# Patient Record
Sex: FEMALE | Race: WHITE | Employment: OTHER | URBAN - METROPOLITAN AREA
[De-identification: names, ages, dates, MRNs, and addresses within clinical notes are randomized per-mention and may not be internally consistent; named-entity substitution may affect disease eponyms.]

---

## 2017-10-25 PROBLEM — M47.812 FACET ARTHROPATHY, CERVICAL: Chronic | Status: ACTIVE | Noted: 2017-10-25

## 2018-05-21 ENCOUNTER — ANESTHESIA EVENT (OUTPATIENT)
Dept: OPERATING ROOM | Age: 78
End: 2018-05-21
Payer: MEDICARE

## 2018-05-21 RX ORDER — LISINOPRIL 10 MG/1
10 TABLET ORAL DAILY
Status: ON HOLD | COMMUNITY
End: 2022-02-26 | Stop reason: HOSPADM

## 2018-05-23 ENCOUNTER — HOSPITAL ENCOUNTER (OUTPATIENT)
Age: 78
Setting detail: OUTPATIENT SURGERY
Discharge: HOME | End: 2018-05-23
Attending: ANESTHESIOLOGY | Admitting: ANESTHESIOLOGY
Payer: MEDICARE

## 2018-05-23 ENCOUNTER — ANESTHESIA (OUTPATIENT)
Dept: OPERATING ROOM | Age: 78
End: 2018-05-23
Payer: MEDICARE

## 2018-05-23 VITALS
DIASTOLIC BLOOD PRESSURE: 72 MMHG | RESPIRATION RATE: 12 BRPM | SYSTOLIC BLOOD PRESSURE: 172 MMHG | OXYGEN SATURATION: 100 %

## 2018-05-23 VITALS
SYSTOLIC BLOOD PRESSURE: 183 MMHG | RESPIRATION RATE: 16 BRPM | HEART RATE: 76 BPM | TEMPERATURE: 98 F | HEIGHT: 64 IN | BODY MASS INDEX: 21.34 KG/M2 | OXYGEN SATURATION: 99 % | WEIGHT: 125 LBS | DIASTOLIC BLOOD PRESSURE: 94 MMHG

## 2018-05-23 DIAGNOSIS — M54.81 BILATERAL OCCIPITAL NEURALGIA: ICD-10-CM

## 2018-05-23 PROCEDURE — 3700000000 HC ANESTHESIA ATTENDED CARE: Performed by: ANESTHESIOLOGY

## 2018-05-23 PROCEDURE — 7100000011 HC PHASE II RECOVERY - ADDTL 15 MIN: Performed by: ANESTHESIOLOGY

## 2018-05-23 PROCEDURE — 2500000003 HC RX 250 WO HCPCS: Performed by: ANESTHESIOLOGY

## 2018-05-23 PROCEDURE — 6360000002 HC RX W HCPCS: Performed by: NURSE ANESTHETIST, CERTIFIED REGISTERED

## 2018-05-23 PROCEDURE — 3600000005 HC SURGERY LEVEL 5 BASE: Performed by: ANESTHESIOLOGY

## 2018-05-23 PROCEDURE — 2500000003 HC RX 250 WO HCPCS: Performed by: NURSE ANESTHETIST, CERTIFIED REGISTERED

## 2018-05-23 PROCEDURE — 2580000003 HC RX 258: Performed by: ANESTHESIOLOGY

## 2018-05-23 PROCEDURE — 7100000010 HC PHASE II RECOVERY - FIRST 15 MIN: Performed by: ANESTHESIOLOGY

## 2018-05-23 PROCEDURE — 6360000002 HC RX W HCPCS: Performed by: ANESTHESIOLOGY

## 2018-05-23 RX ORDER — LIDOCAINE HYDROCHLORIDE 20 MG/ML
INJECTION, SOLUTION EPIDURAL; INFILTRATION; INTRACAUDAL; PERINEURAL PRN
Status: DISCONTINUED | OUTPATIENT
Start: 2018-05-23 | End: 2018-05-23 | Stop reason: SDUPTHER

## 2018-05-23 RX ORDER — PROPOFOL 10 MG/ML
INJECTION, EMULSION INTRAVENOUS PRN
Status: DISCONTINUED | OUTPATIENT
Start: 2018-05-23 | End: 2018-05-23 | Stop reason: SDUPTHER

## 2018-05-23 RX ORDER — ONDANSETRON 2 MG/ML
INJECTION INTRAMUSCULAR; INTRAVENOUS PRN
Status: DISCONTINUED | OUTPATIENT
Start: 2018-05-23 | End: 2018-05-23 | Stop reason: SDUPTHER

## 2018-05-23 RX ORDER — SODIUM CHLORIDE, SODIUM LACTATE, POTASSIUM CHLORIDE, CALCIUM CHLORIDE 600; 310; 30; 20 MG/100ML; MG/100ML; MG/100ML; MG/100ML
INJECTION, SOLUTION INTRAVENOUS CONTINUOUS
Status: DISCONTINUED | OUTPATIENT
Start: 2018-05-23 | End: 2018-05-23 | Stop reason: HOSPADM

## 2018-05-23 RX ORDER — LIDOCAINE HYDROCHLORIDE 10 MG/ML
INJECTION, SOLUTION EPIDURAL; INFILTRATION; INTRACAUDAL; PERINEURAL PRN
Status: DISCONTINUED | OUTPATIENT
Start: 2018-05-23 | End: 2018-05-23 | Stop reason: HOSPADM

## 2018-05-23 RX ADMIN — PROPOFOL 20 MG: 10 INJECTION, EMULSION INTRAVENOUS at 15:58

## 2018-05-23 RX ADMIN — SODIUM CHLORIDE, POTASSIUM CHLORIDE, SODIUM LACTATE AND CALCIUM CHLORIDE: 600; 310; 30; 20 INJECTION, SOLUTION INTRAVENOUS at 15:24

## 2018-05-23 RX ADMIN — ONDANSETRON 4 MG: 2 INJECTION, SOLUTION INTRAMUSCULAR; INTRAVENOUS at 15:57

## 2018-05-23 RX ADMIN — LIDOCAINE HYDROCHLORIDE 25 MG: 20 INJECTION, SOLUTION EPIDURAL; INFILTRATION; INTRACAUDAL; PERINEURAL at 15:58

## 2018-05-23 RX ADMIN — PROPOFOL 20 MG: 10 INJECTION, EMULSION INTRAVENOUS at 15:59

## 2018-05-23 ASSESSMENT — PAIN DESCRIPTION - DESCRIPTORS: DESCRIPTORS: DISCOMFORT;STABBING

## 2018-05-23 ASSESSMENT — PULMONARY FUNCTION TESTS
PIF_VALUE: 0
PIF_VALUE: 1

## 2018-05-23 ASSESSMENT — PAIN SCALES - GENERAL
PAINLEVEL_OUTOF10: 0

## 2018-05-23 ASSESSMENT — PAIN - FUNCTIONAL ASSESSMENT: PAIN_FUNCTIONAL_ASSESSMENT: 0-10

## 2018-06-05 ENCOUNTER — ANESTHESIA EVENT (OUTPATIENT)
Dept: OPERATING ROOM | Age: 78
End: 2018-06-05
Payer: MEDICARE

## 2018-06-06 ENCOUNTER — HOSPITAL ENCOUNTER (OUTPATIENT)
Dept: OPERATING ROOM | Age: 78
Discharge: HOME | End: 2018-06-06
Payer: MEDICARE

## 2018-06-06 ENCOUNTER — HOSPITAL ENCOUNTER (OUTPATIENT)
Age: 78
Setting detail: OUTPATIENT SURGERY
Discharge: HOME | End: 2018-06-06
Attending: ANESTHESIOLOGY | Admitting: ANESTHESIOLOGY
Payer: MEDICARE

## 2018-06-06 ENCOUNTER — ANESTHESIA (OUTPATIENT)
Dept: OPERATING ROOM | Age: 78
End: 2018-06-06
Payer: MEDICARE

## 2018-06-06 VITALS
HEART RATE: 67 BPM | SYSTOLIC BLOOD PRESSURE: 161 MMHG | OXYGEN SATURATION: 99 % | DIASTOLIC BLOOD PRESSURE: 71 MMHG | HEIGHT: 65 IN | BODY MASS INDEX: 20.99 KG/M2 | WEIGHT: 126 LBS | RESPIRATION RATE: 16 BRPM

## 2018-06-06 VITALS
DIASTOLIC BLOOD PRESSURE: 69 MMHG | OXYGEN SATURATION: 100 % | TEMPERATURE: 98.6 F | RESPIRATION RATE: 12 BRPM | SYSTOLIC BLOOD PRESSURE: 136 MMHG

## 2018-06-06 DIAGNOSIS — M54.81 BILATERAL OCCIPITAL NEURALGIA: ICD-10-CM

## 2018-06-06 PROCEDURE — 7100000010 HC PHASE II RECOVERY - FIRST 15 MIN: Performed by: ANESTHESIOLOGY

## 2018-06-06 PROCEDURE — 7100000011 HC PHASE II RECOVERY - ADDTL 15 MIN: Performed by: ANESTHESIOLOGY

## 2018-06-06 PROCEDURE — 3209999900 FLUORO FOR SURGICAL PROCEDURES

## 2018-06-06 PROCEDURE — 3600000005 HC SURGERY LEVEL 5 BASE: Performed by: ANESTHESIOLOGY

## 2018-06-06 PROCEDURE — 3700000000 HC ANESTHESIA ATTENDED CARE: Performed by: ANESTHESIOLOGY

## 2018-06-06 PROCEDURE — 2500000003 HC RX 250 WO HCPCS: Performed by: ANESTHESIOLOGY

## 2018-06-06 PROCEDURE — 2500000003 HC RX 250 WO HCPCS: Performed by: NURSE ANESTHETIST, CERTIFIED REGISTERED

## 2018-06-06 PROCEDURE — 6360000002 HC RX W HCPCS: Performed by: ANESTHESIOLOGY

## 2018-06-06 PROCEDURE — 6360000002 HC RX W HCPCS: Performed by: NURSE ANESTHETIST, CERTIFIED REGISTERED

## 2018-06-06 PROCEDURE — 2580000003 HC RX 258: Performed by: ANESTHESIOLOGY

## 2018-06-06 RX ORDER — MIDAZOLAM HYDROCHLORIDE 1 MG/ML
INJECTION INTRAMUSCULAR; INTRAVENOUS PRN
Status: DISCONTINUED | OUTPATIENT
Start: 2018-06-06 | End: 2018-06-06 | Stop reason: SDUPTHER

## 2018-06-06 RX ORDER — SODIUM CHLORIDE, SODIUM LACTATE, POTASSIUM CHLORIDE, CALCIUM CHLORIDE 600; 310; 30; 20 MG/100ML; MG/100ML; MG/100ML; MG/100ML
INJECTION, SOLUTION INTRAVENOUS CONTINUOUS
Status: DISCONTINUED | OUTPATIENT
Start: 2018-06-06 | End: 2018-06-06 | Stop reason: HOSPADM

## 2018-06-06 RX ORDER — FENTANYL CITRATE 50 UG/ML
INJECTION, SOLUTION INTRAMUSCULAR; INTRAVENOUS PRN
Status: DISCONTINUED | OUTPATIENT
Start: 2018-06-06 | End: 2018-06-06 | Stop reason: SDUPTHER

## 2018-06-06 RX ORDER — PROPOFOL 10 MG/ML
INJECTION, EMULSION INTRAVENOUS PRN
Status: DISCONTINUED | OUTPATIENT
Start: 2018-06-06 | End: 2018-06-06 | Stop reason: SDUPTHER

## 2018-06-06 RX ORDER — LIDOCAINE HYDROCHLORIDE 20 MG/ML
INJECTION, SOLUTION INFILTRATION; PERINEURAL PRN
Status: DISCONTINUED | OUTPATIENT
Start: 2018-06-06 | End: 2018-06-06 | Stop reason: SDUPTHER

## 2018-06-06 RX ADMIN — FENTANYL CITRATE 50 MCG: 50 INJECTION, SOLUTION INTRAMUSCULAR; INTRAVENOUS at 12:36

## 2018-06-06 RX ADMIN — SODIUM CHLORIDE, POTASSIUM CHLORIDE, SODIUM LACTATE AND CALCIUM CHLORIDE: 600; 310; 30; 20 INJECTION, SOLUTION INTRAVENOUS at 10:40

## 2018-06-06 RX ADMIN — FENTANYL CITRATE 50 MCG: 50 INJECTION, SOLUTION INTRAMUSCULAR; INTRAVENOUS at 12:38

## 2018-06-06 RX ADMIN — PROPOFOL 20 MG: 10 INJECTION, EMULSION INTRAVENOUS at 12:42

## 2018-06-06 RX ADMIN — MIDAZOLAM HYDROCHLORIDE 2 MG: 1 INJECTION INTRAMUSCULAR; INTRAVENOUS at 12:34

## 2018-06-06 RX ADMIN — LIDOCAINE HYDROCHLORIDE 10 MG: 20 INJECTION, SOLUTION INFILTRATION; PERINEURAL at 12:42

## 2018-06-06 ASSESSMENT — PULMONARY FUNCTION TESTS
PIF_VALUE: 0

## 2018-06-06 ASSESSMENT — PAIN SCALES - GENERAL
PAINLEVEL_OUTOF10: 0

## 2018-06-06 ASSESSMENT — PAIN - FUNCTIONAL ASSESSMENT: PAIN_FUNCTIONAL_ASSESSMENT: 0-10

## 2018-07-10 ENCOUNTER — ANESTHESIA EVENT (OUTPATIENT)
Dept: OPERATING ROOM | Age: 78
End: 2018-07-10
Payer: MEDICARE

## 2018-07-10 NOTE — ANESTHESIA PRE PROCEDURE
IRAIS NOSE/CLEFT LIP/TIP Bilateral 6/6/2018    BILATERAL GREATER OCCIPITAL NERVE BLOCK #2 UNDER ANESTHESIA performed by Evette Cogan, DO at [facility] Left 10/18/2016    anterior       Social History:    Social History   Substance Use Topics    Smoking status: Never Smoker    Smokeless tobacco: Never Used    Alcohol use No                                Counseling given: Not Answered      Vital Signs (Current): There were no vitals filed for this visit. BP Readings from Last 3 Encounters:   06/06/18 136/69   06/06/18 (!) 161/71   05/23/18 (!) 172/72       NPO Status:                                                                                 BMI:   Wt Readings from Last 3 Encounters:   06/06/18 126 lb (57.2 kg)   05/23/18 125 lb (56.7 kg)   11/01/17 123 lb 3.2 oz (55.9 kg)     There is no height or weight on file to calculate BMI.    CBC:   Lab Results   Component Value Date    WBC 8.2 10/21/2016    RBC 3.51 10/21/2016    HGB 11.0 10/21/2016    HCT 32.1 10/21/2016    MCV 91.6 10/21/2016    RDW 13.1 10/21/2016     10/21/2016       CMP:   Lab Results   Component Value Date     10/21/2016    K 4.4 10/21/2016    CL 98 10/21/2016    CO2 28 10/21/2016    BUN 16 10/21/2016    CREATININE 0.8 10/21/2016    GFRAA >60 10/21/2016    LABGLOM >60 10/21/2016    GLUCOSE 129 10/21/2016    GLUCOSE 122 11/11/2011    PROT 5.6 10/19/2016    CALCIUM 8.6 10/21/2016    BILITOT 0.4 10/19/2016    ALKPHOS 45 10/19/2016    AST 37 10/19/2016    ALT 17 10/19/2016       POC Tests: No results for input(s): POCGLU, POCNA, POCK, POCCL, POCBUN, POCHEMO, POCHCT in the last 72 hours.     Coags:   Lab Results   Component Value Date    PROTIME 10.9 10/11/2016    PROTIME 18.6 11/10/2011    INR 1.0 10/11/2016    APTT 26.2 10/11/2016       HCG (If Applicable): No results found for: PREGTESTUR, PREGSERUM, HCG, HCGQUANT     ABGs: No results found for: PHART, PO2ART, DEZ1QEY, ACO7XQB, BEART, F8OSJPRM     Type & Screen (If Applicable):  No results found for: LABABO, [address]    Anesthesia Evaluation  Patient summary reviewed   history of anesthetic complications: PONV. Airway: Mallampati: IV  TM distance: >3 FB   Neck ROM: limited  Mouth opening: > = 3 FB Dental:          Pulmonary:Negative Pulmonary ROS and normal exam  breath sounds clear to auscultation                             Cardiovascular:    (+) hypertension:,       ECG reviewed  Rhythm: regular  Rate: normal           Beta Blocker:  Not on Beta Blocker      ROS comment: EKG: Normal Sinus Rhythm 75, poor R Progression. Neuro/Psych:   Negative Neuro/Psych ROS              GI/Hepatic/Renal: Neg GI/Hepatic/Renal ROS            Endo/Other:    (+) : arthritis:., .                  ROS comment: Menopausal sweats. H/O Hip Replacement. Abdominal:           Vascular: negative vascular ROS. Anesthesia Plan      MAC     ASA 3       Induction: intravenous. Anesthetic plan and risks discussed with patient. Plan discussed with CRNA.     Attending anesthesiologist reviewed and agrees with Pre Eval content            Swati Hawley MD   7/10/2018

## 2018-07-11 ENCOUNTER — HOSPITAL ENCOUNTER (OUTPATIENT)
Age: 78
Setting detail: OUTPATIENT SURGERY
Discharge: HOME | End: 2018-07-11
Attending: ANESTHESIOLOGY | Admitting: ANESTHESIOLOGY
Payer: MEDICARE

## 2018-07-11 ENCOUNTER — HOSPITAL ENCOUNTER (OUTPATIENT)
Dept: OPERATING ROOM | Age: 78
Setting detail: OUTPATIENT SURGERY
Discharge: HOME | End: 2018-07-11
Attending: ANESTHESIOLOGY
Payer: MEDICARE

## 2018-07-11 ENCOUNTER — ANESTHESIA (OUTPATIENT)
Dept: OPERATING ROOM | Age: 78
End: 2018-07-11
Payer: MEDICARE

## 2018-07-11 VITALS
DIASTOLIC BLOOD PRESSURE: 79 MMHG | OXYGEN SATURATION: 99 % | RESPIRATION RATE: 9 BRPM | SYSTOLIC BLOOD PRESSURE: 179 MMHG

## 2018-07-11 VITALS
SYSTOLIC BLOOD PRESSURE: 171 MMHG | OXYGEN SATURATION: 98 % | HEIGHT: 65 IN | RESPIRATION RATE: 14 BRPM | BODY MASS INDEX: 20.99 KG/M2 | HEART RATE: 78 BPM | DIASTOLIC BLOOD PRESSURE: 75 MMHG | WEIGHT: 126 LBS

## 2018-07-11 DIAGNOSIS — M54.81 OCCIPITAL NEURALGIA OF LEFT SIDE: ICD-10-CM

## 2018-07-11 PROCEDURE — 7100000010 HC PHASE II RECOVERY - FIRST 15 MIN: Performed by: ANESTHESIOLOGY

## 2018-07-11 PROCEDURE — 2500000003 HC RX 250 WO HCPCS: Performed by: NURSE ANESTHETIST, CERTIFIED REGISTERED

## 2018-07-11 PROCEDURE — 3700000001 HC ADD 15 MINUTES (ANESTHESIA): Performed by: ANESTHESIOLOGY

## 2018-07-11 PROCEDURE — 7100000011 HC PHASE II RECOVERY - ADDTL 15 MIN: Performed by: ANESTHESIOLOGY

## 2018-07-11 PROCEDURE — 2500000003 HC RX 250 WO HCPCS: Performed by: ANESTHESIOLOGY

## 2018-07-11 PROCEDURE — 2580000003 HC RX 258: Performed by: ANESTHESIOLOGY

## 2018-07-11 PROCEDURE — 3700000000 HC ANESTHESIA ATTENDED CARE: Performed by: ANESTHESIOLOGY

## 2018-07-11 PROCEDURE — 3209999900 FLUORO FOR SURGICAL PROCEDURES

## 2018-07-11 PROCEDURE — 3600000015 HC SURGERY LEVEL 5 ADDTL 15MIN: Performed by: ANESTHESIOLOGY

## 2018-07-11 PROCEDURE — 3600000005 HC SURGERY LEVEL 5 BASE: Performed by: ANESTHESIOLOGY

## 2018-07-11 PROCEDURE — 6360000002 HC RX W HCPCS: Performed by: ANESTHESIOLOGY

## 2018-07-11 PROCEDURE — 6360000002 HC RX W HCPCS: Performed by: NURSE ANESTHETIST, CERTIFIED REGISTERED

## 2018-07-11 RX ORDER — SODIUM CHLORIDE, SODIUM LACTATE, POTASSIUM CHLORIDE, CALCIUM CHLORIDE 600; 310; 30; 20 MG/100ML; MG/100ML; MG/100ML; MG/100ML
INJECTION, SOLUTION INTRAVENOUS CONTINUOUS
Status: DISCONTINUED | OUTPATIENT
Start: 2018-07-11 | End: 2018-07-11 | Stop reason: HOSPADM

## 2018-07-11 RX ORDER — ONDANSETRON 2 MG/ML
INJECTION INTRAMUSCULAR; INTRAVENOUS PRN
Status: DISCONTINUED | OUTPATIENT
Start: 2018-07-11 | End: 2018-07-11 | Stop reason: SDUPTHER

## 2018-07-11 RX ORDER — LABETALOL HYDROCHLORIDE 5 MG/ML
INJECTION, SOLUTION INTRAVENOUS PRN
Status: DISCONTINUED | OUTPATIENT
Start: 2018-07-11 | End: 2018-07-11

## 2018-07-11 RX ORDER — FENTANYL CITRATE 50 UG/ML
INJECTION, SOLUTION INTRAMUSCULAR; INTRAVENOUS PRN
Status: DISCONTINUED | OUTPATIENT
Start: 2018-07-11 | End: 2018-07-11 | Stop reason: SDUPTHER

## 2018-07-11 RX ORDER — LIDOCAINE HYDROCHLORIDE 10 MG/ML
INJECTION, SOLUTION EPIDURAL; INFILTRATION; INTRACAUDAL; PERINEURAL PRN
Status: DISCONTINUED | OUTPATIENT
Start: 2018-07-11 | End: 2018-07-11 | Stop reason: HOSPADM

## 2018-07-11 RX ORDER — LIDOCAINE HYDROCHLORIDE 20 MG/ML
INJECTION, SOLUTION EPIDURAL; INFILTRATION; INTRACAUDAL; PERINEURAL PRN
Status: DISCONTINUED | OUTPATIENT
Start: 2018-07-11 | End: 2018-07-11 | Stop reason: HOSPADM

## 2018-07-11 RX ORDER — LABETALOL HYDROCHLORIDE 5 MG/ML
INJECTION, SOLUTION INTRAVENOUS PRN
Status: DISCONTINUED | OUTPATIENT
Start: 2018-07-11 | End: 2018-07-11 | Stop reason: SDUPTHER

## 2018-07-11 RX ADMIN — FENTANYL CITRATE 50 MCG: 50 INJECTION, SOLUTION INTRAMUSCULAR; INTRAVENOUS at 10:44

## 2018-07-11 RX ADMIN — FENTANYL CITRATE 50 MCG: 50 INJECTION, SOLUTION INTRAMUSCULAR; INTRAVENOUS at 10:41

## 2018-07-11 RX ADMIN — LABETALOL HYDROCHLORIDE 5 MG: 5 INJECTION, SOLUTION INTRAVENOUS at 10:43

## 2018-07-11 RX ADMIN — ONDANSETRON 4 MG: 2 INJECTION, SOLUTION INTRAMUSCULAR; INTRAVENOUS at 10:44

## 2018-07-11 RX ADMIN — SODIUM CHLORIDE, POTASSIUM CHLORIDE, SODIUM LACTATE AND CALCIUM CHLORIDE: 600; 310; 30; 20 INJECTION, SOLUTION INTRAVENOUS at 08:17

## 2018-07-11 RX ADMIN — CEFAZOLIN SODIUM 2 G: 2 SOLUTION INTRAVENOUS at 10:37

## 2018-07-11 ASSESSMENT — PAIN SCALES - GENERAL
PAINLEVEL_OUTOF10: 0

## 2018-07-11 ASSESSMENT — PULMONARY FUNCTION TESTS
PIF_VALUE: 0

## 2018-07-11 NOTE — OP NOTE
Greta Pierre    7/11/2018    Preoperative Diagnoses: Left Greater Occipital Neuralgia     Postoperative Diagnoses: Same     Procedure Performed: Therapeutic radiofrequency neurolysis of the Left greater occipital nerve (pulsatile) under direct fluoroscopic guidance. Anesthesia: Local with monitored anesthesia care. Complications:  None. Estimated blood loss: None    History:     Greta Pierre was given I.V. antibiotics preoperatively. Please see the medical record. Jovita Saavedra has tried multiple attempts at conservative outpatient management including physical therapy, nonsteroidal antiinflammatory medications, analgesics, muscle relaxants, transcutaneous electrical nerve stimulation, manipulation, etc., without much relief. Her complete History & Physical examination was reviewed and it is unchanged. Jovita Saavedra was seen by the department of anesthesia. It is required they provide local monitored anesthesia care [facility]) anesthesia for  so the patient is not moving during this very delicate procedure, yet keeping her fully awake and oriented at all times so she can fully cooperate. Please see the anesthesia record for further details. Description of Procedure:     Jovita Saavedra was brought to the operating room  at [address] [place] [address]. She was placed in the prone position with the head turned  and the greater occipital groove identified anatomically and by fluoroscopic guidance, prepped and draped in the usual sterile manner. A time-out was performed and confirmed the patients name, date of birth, the procedure to be performed and skin marked for appropriate site and side of procedure. All questions and concerns were answered and the patient requested we proceed.       Following Xylocaine 1%, 3ml. with the #25 gauge 1-1/2-inch disposable needle for local analgesia, the radiofrequency needle #5 was introduced down to the right greater occipital groove into greater occipital nerve under direct fluoroscopic guidance. Positive reproduction of the patients pain did occur. Upon completion of the above, we then tested both sensory and motor to make sure we had good position over the greater occipital nerve and an excellent reproduction of her pain and excellent distribution of the greater occipital nerve, confirming its exact and proper location. Upon completion of the above, we then did pulsatile radiofrequency neurilysis of  this nerve for 120 seconds at 40 degrees centigrade in the usual manner without difficulty. Upon completion of the above, the needle was removed together and intact. She did receive a band-Aid over the puncture site. Tonny Vidales was taken to the PACU where she was found to be in stable condition without complications with excellent results and no complication. Tonny Vidales was given discharge instructions with her next scheduled appointment.     Electronically signed by Sandeep Cloud

## 2018-07-11 NOTE — ANESTHESIA POSTPROCEDURE EVALUATION
Department of Anesthesiology  Postprocedure Note    Patient: Aakash Jameson  MRN: [id number]  YOB: 1940  Date of evaluation: 7/11/2018  Time:  11:45 AM     Procedure Summary     Date:  07/11/18 Room / Location:  [facility] OR Jose Alejandro / Cristopher Guillaume    Anesthesia Start:  1041 Anesthesia Stop:  6508    Procedure:  LEFT  GREATER OCCIPITAL RADIOFREQUENCY (Left ) Diagnosis:  (Kal Stevens)    Surgeon:  Juventino Rosen DO Responsible Provider:  Jasmin Nance MD    Anesthesia Type:  MAC ASA Status:  3          Anesthesia Type: MAC    Yan Phase I: Yan Score: 10    Yan Phase II: Yan Score: 10    Last vitals: Reviewed and per EMR flowsheets.        Anesthesia Post Evaluation    Patient location during evaluation: PACU  Patient participation: complete - patient participated  Level of consciousness: awake  Pain score: 0  Airway patency: patent  Nausea & Vomiting: no nausea  Complications: no  Cardiovascular status: blood pressure returned to baseline  Respiratory status: acceptable  Hydration status: euvolemic

## 2018-08-14 ENCOUNTER — ANESTHESIA EVENT (OUTPATIENT)
Dept: OPERATING ROOM | Age: 78
End: 2018-08-14
Payer: MEDICARE

## 2018-08-15 ENCOUNTER — ANESTHESIA (OUTPATIENT)
Dept: OPERATING ROOM | Age: 78
End: 2018-08-15
Payer: MEDICARE

## 2018-08-15 ENCOUNTER — HOSPITAL ENCOUNTER (OUTPATIENT)
Dept: OPERATING ROOM | Age: 78
Setting detail: OUTPATIENT SURGERY
Discharge: HOME | End: 2018-08-15
Attending: ANESTHESIOLOGY
Payer: MEDICARE

## 2018-08-15 ENCOUNTER — HOSPITAL ENCOUNTER (OUTPATIENT)
Age: 78
Setting detail: OUTPATIENT SURGERY
Discharge: HOME | End: 2018-08-15
Attending: ANESTHESIOLOGY | Admitting: ANESTHESIOLOGY
Payer: MEDICARE

## 2018-08-15 VITALS
SYSTOLIC BLOOD PRESSURE: 186 MMHG | OXYGEN SATURATION: 99 % | DIASTOLIC BLOOD PRESSURE: 88 MMHG | RESPIRATION RATE: 21 BRPM

## 2018-08-15 VITALS
SYSTOLIC BLOOD PRESSURE: 165 MMHG | HEART RATE: 66 BPM | HEIGHT: 65 IN | DIASTOLIC BLOOD PRESSURE: 82 MMHG | BODY MASS INDEX: 21.16 KG/M2 | OXYGEN SATURATION: 97 % | TEMPERATURE: 97 F | RESPIRATION RATE: 16 BRPM | WEIGHT: 127 LBS

## 2018-08-15 DIAGNOSIS — M54.81 OCCIPITAL NEURALGIA OF RIGHT SIDE: ICD-10-CM

## 2018-08-15 PROCEDURE — 3209999900 FLUORO FOR SURGICAL PROCEDURES

## 2018-08-15 PROCEDURE — 3700000000 HC ANESTHESIA ATTENDED CARE: Performed by: ANESTHESIOLOGY

## 2018-08-15 PROCEDURE — 3600000015 HC SURGERY LEVEL 5 ADDTL 15MIN: Performed by: ANESTHESIOLOGY

## 2018-08-15 PROCEDURE — 2500000003 HC RX 250 WO HCPCS: Performed by: ANESTHESIOLOGY

## 2018-08-15 PROCEDURE — 6360000002 HC RX W HCPCS: Performed by: ANESTHESIOLOGY

## 2018-08-15 PROCEDURE — 3600000005 HC SURGERY LEVEL 5 BASE: Performed by: ANESTHESIOLOGY

## 2018-08-15 PROCEDURE — 2709999900 HC NON-CHARGEABLE SUPPLY: Performed by: ANESTHESIOLOGY

## 2018-08-15 PROCEDURE — C1713 ANCHOR/SCREW BN/BN,TIS/BN: HCPCS | Performed by: ANESTHESIOLOGY

## 2018-08-15 PROCEDURE — 7100000011 HC PHASE II RECOVERY - ADDTL 15 MIN: Performed by: ANESTHESIOLOGY

## 2018-08-15 PROCEDURE — 7100000010 HC PHASE II RECOVERY - FIRST 15 MIN: Performed by: ANESTHESIOLOGY

## 2018-08-15 PROCEDURE — 3700000001 HC ADD 15 MINUTES (ANESTHESIA): Performed by: ANESTHESIOLOGY

## 2018-08-15 PROCEDURE — 6360000002 HC RX W HCPCS: Performed by: NURSE ANESTHETIST, CERTIFIED REGISTERED

## 2018-08-15 PROCEDURE — 2580000003 HC RX 258

## 2018-08-15 RX ORDER — LIDOCAINE HYDROCHLORIDE 20 MG/ML
INJECTION, SOLUTION EPIDURAL; INFILTRATION; INTRACAUDAL; PERINEURAL PRN
Status: DISCONTINUED | OUTPATIENT
Start: 2018-08-15 | End: 2018-08-15 | Stop reason: HOSPADM

## 2018-08-15 RX ORDER — FENTANYL CITRATE 50 UG/ML
INJECTION, SOLUTION INTRAMUSCULAR; INTRAVENOUS PRN
Status: DISCONTINUED | OUTPATIENT
Start: 2018-08-15 | End: 2018-08-15 | Stop reason: SDUPTHER

## 2018-08-15 RX ORDER — SODIUM CHLORIDE, SODIUM LACTATE, POTASSIUM CHLORIDE, CALCIUM CHLORIDE 600; 310; 30; 20 MG/100ML; MG/100ML; MG/100ML; MG/100ML
INJECTION, SOLUTION INTRAVENOUS CONTINUOUS
Status: DISCONTINUED | OUTPATIENT
Start: 2018-08-15 | End: 2018-08-15 | Stop reason: HOSPADM

## 2018-08-15 RX ORDER — LIDOCAINE HYDROCHLORIDE 10 MG/ML
INJECTION, SOLUTION EPIDURAL; INFILTRATION; INTRACAUDAL; PERINEURAL PRN
Status: DISCONTINUED | OUTPATIENT
Start: 2018-08-15 | End: 2018-08-15 | Stop reason: HOSPADM

## 2018-08-15 RX ORDER — ONDANSETRON 2 MG/ML
INJECTION INTRAMUSCULAR; INTRAVENOUS PRN
Status: DISCONTINUED | OUTPATIENT
Start: 2018-08-15 | End: 2018-08-15 | Stop reason: SDUPTHER

## 2018-08-15 RX ORDER — KETOROLAC TROMETHAMINE 30 MG/ML
INJECTION, SOLUTION INTRAMUSCULAR; INTRAVENOUS PRN
Status: DISCONTINUED | OUTPATIENT
Start: 2018-08-15 | End: 2018-08-15 | Stop reason: SDUPTHER

## 2018-08-15 RX ADMIN — CEFAZOLIN SODIUM 2 G: 2 SOLUTION INTRAVENOUS at 11:30

## 2018-08-15 RX ADMIN — ONDANSETRON HYDROCHLORIDE 4 MG: 2 INJECTION, SOLUTION INTRAMUSCULAR; INTRAVENOUS at 11:31

## 2018-08-15 RX ADMIN — KETOROLAC TROMETHAMINE 15 MG: 30 INJECTION, SOLUTION INTRAMUSCULAR; INTRAVENOUS at 11:53

## 2018-08-15 RX ADMIN — SODIUM CHLORIDE, SODIUM LACTATE, POTASSIUM CHLORIDE, CALCIUM CHLORIDE: 600; 310; 30; 20 INJECTION, SOLUTION INTRAVENOUS at 11:02

## 2018-08-15 RX ADMIN — FENTANYL CITRATE 50 MCG: 50 INJECTION, SOLUTION INTRAMUSCULAR; INTRAVENOUS at 11:31

## 2018-08-15 RX ADMIN — FENTANYL CITRATE 50 MCG: 50 INJECTION, SOLUTION INTRAMUSCULAR; INTRAVENOUS at 11:40

## 2018-08-15 ASSESSMENT — PAIN SCALES - GENERAL
PAINLEVEL_OUTOF10: 0

## 2018-08-15 ASSESSMENT — PAIN - FUNCTIONAL ASSESSMENT: PAIN_FUNCTIONAL_ASSESSMENT: 0-10

## 2018-08-15 NOTE — OP NOTE
guidance. Positive reproduction of the patients pain did occur. Upon completion of the above, we then tested both sensory and motor to make sure we had good position over the greater occipital nerve and an excellent reproduction of her pain and excellent distribution of the greater occipital nerve, confirming its exact and proper location. Upon completion of the above, we then did pulsatile radiofrequency neurilysis of  this nerve for 120 seconds at 40 degrees centigrade in the usual manner without difficulty. Upon completion of the above, the needle was removed together and intact. She did receive a band-Aid over the puncture site. Tr Lopez was taken to the PACU where she was found to be in stable condition without complications with excellent results and no complication. Tr Lopez was given discharge instructions with her next scheduled appointment.     Electronically signed by [place] Hollie

## 2019-07-17 ENCOUNTER — OFFICE VISIT (OUTPATIENT)
Dept: ORTHOPEDIC SURGERY | Age: 79
End: 2019-07-17
Payer: MEDICARE

## 2019-07-17 VITALS — BODY MASS INDEX: 20.83 KG/M2 | HEIGHT: 65 IN | WEIGHT: 125 LBS

## 2019-07-17 DIAGNOSIS — M17.11 PRIMARY OSTEOARTHRITIS OF RIGHT KNEE: Primary | ICD-10-CM

## 2019-07-17 PROCEDURE — 1123F ACP DISCUSS/DSCN MKR DOCD: CPT | Performed by: ORTHOPAEDIC SURGERY

## 2019-07-17 PROCEDURE — 1090F PRES/ABSN URINE INCON ASSESS: CPT | Performed by: ORTHOPAEDIC SURGERY

## 2019-07-17 PROCEDURE — 4040F PNEUMOC VAC/ADMIN/RCVD: CPT | Performed by: ORTHOPAEDIC SURGERY

## 2019-07-17 PROCEDURE — G8427 DOCREV CUR MEDS BY ELIG CLIN: HCPCS | Performed by: ORTHOPAEDIC SURGERY

## 2019-07-17 PROCEDURE — G8420 CALC BMI NORM PARAMETERS: HCPCS | Performed by: ORTHOPAEDIC SURGERY

## 2019-07-17 PROCEDURE — G8400 PT W/DXA NO RESULTS DOC: HCPCS | Performed by: ORTHOPAEDIC SURGERY

## 2019-07-17 PROCEDURE — 1036F TOBACCO NON-USER: CPT | Performed by: ORTHOPAEDIC SURGERY

## 2019-07-17 PROCEDURE — 99213 OFFICE O/P EST LOW 20 MIN: CPT | Performed by: ORTHOPAEDIC SURGERY

## 2019-07-17 RX ORDER — GABAPENTIN 100 MG/1
CAPSULE ORAL
Refills: 10 | COMMUNITY
Start: 2019-07-07 | End: 2022-02-24

## 2019-10-18 ENCOUNTER — OFFICE VISIT (OUTPATIENT)
Dept: ORTHOPEDIC SURGERY | Age: 79
End: 2019-10-18
Payer: MEDICARE

## 2019-10-18 VITALS — WEIGHT: 125 LBS | BODY MASS INDEX: 20.83 KG/M2 | HEIGHT: 65 IN

## 2019-10-18 DIAGNOSIS — M17.11 PRIMARY OSTEOARTHRITIS OF RIGHT KNEE: Primary | ICD-10-CM

## 2019-10-18 PROCEDURE — 20610 DRAIN/INJ JOINT/BURSA W/O US: CPT | Performed by: ORTHOPAEDIC SURGERY

## 2021-09-09 ENCOUNTER — OFFICE VISIT (OUTPATIENT)
Dept: PODIATRY | Age: 81
End: 2021-09-09
Payer: MEDICARE

## 2021-09-09 VITALS — HEIGHT: 65 IN | BODY MASS INDEX: 20.99 KG/M2 | WEIGHT: 126 LBS

## 2021-09-09 DIAGNOSIS — I73.9 PVD (PERIPHERAL VASCULAR DISEASE) (HCC): ICD-10-CM

## 2021-09-09 DIAGNOSIS — I87.2 VENOUS INSUFFICIENCY (CHRONIC) (PERIPHERAL): ICD-10-CM

## 2021-09-09 DIAGNOSIS — M79.674 PAIN OF TOE OF RIGHT FOOT: ICD-10-CM

## 2021-09-09 DIAGNOSIS — B35.1 ONYCHOMYCOSIS: Primary | ICD-10-CM

## 2021-09-09 DIAGNOSIS — M79.675 PAIN OF TOE OF LEFT FOOT: ICD-10-CM

## 2021-09-09 DIAGNOSIS — R26.2 DIFFICULTY WALKING: ICD-10-CM

## 2021-09-09 PROCEDURE — 1090F PRES/ABSN URINE INCON ASSESS: CPT | Performed by: PODIATRIST

## 2021-09-09 PROCEDURE — 11721 DEBRIDE NAIL 6 OR MORE: CPT | Performed by: PODIATRIST

## 2021-09-09 PROCEDURE — 99203 OFFICE O/P NEW LOW 30 MIN: CPT | Performed by: PODIATRIST

## 2021-09-09 PROCEDURE — G8400 PT W/DXA NO RESULTS DOC: HCPCS | Performed by: PODIATRIST

## 2021-09-09 PROCEDURE — 1036F TOBACCO NON-USER: CPT | Performed by: PODIATRIST

## 2021-09-09 PROCEDURE — 4040F PNEUMOC VAC/ADMIN/RCVD: CPT | Performed by: PODIATRIST

## 2021-09-09 PROCEDURE — G8427 DOCREV CUR MEDS BY ELIG CLIN: HCPCS | Performed by: PODIATRIST

## 2021-09-09 PROCEDURE — 1123F ACP DISCUSS/DSCN MKR DOCD: CPT | Performed by: PODIATRIST

## 2021-09-09 PROCEDURE — G8420 CALC BMI NORM PARAMETERS: HCPCS | Performed by: PODIATRIST

## 2021-09-10 PROBLEM — R26.2 DIFFICULTY WALKING: Status: ACTIVE | Noted: 2021-09-10

## 2021-09-10 PROBLEM — I87.2 VENOUS INSUFFICIENCY (CHRONIC) (PERIPHERAL): Status: ACTIVE | Noted: 2021-09-10

## 2021-09-10 PROBLEM — B35.1 ONYCHOMYCOSIS: Status: ACTIVE | Noted: 2021-09-10

## 2021-09-10 PROBLEM — I73.9 PVD (PERIPHERAL VASCULAR DISEASE) (HCC): Status: ACTIVE | Noted: 2021-09-10

## 2021-09-10 NOTE — PROGRESS NOTES
9/10/21     Belia Carry    : 1940 Sex: female   Age: 78 y.o. Patient was referred by: None  Patient's PCP/Provider is:  Yola Vela MD    Subjective:    Patient seen today with her daughter for evaluation regarding mycotic nail care issues. Chief Complaint   Patient presents with    Nail Problem     nail care       HPI: Patient is unable to debride her nails at this time due to arthritic issues. She has not had her nails debrided for several years. She is starting to have issues with every day shoe gear irritation with ambulatory activities. No other additional abnormalities noted. ROS:  Const: Positives and pertinent negatives as per HPI. Musculo: Denies symptoms other than stated above. Neuro: Denies symptoms other than stated above. Skin: Denies symptoms other than stated above. Current Medications:    Current Outpatient Medications:     lisinopril (PRINIVIL;ZESTRIL) 10 MG tablet, Take 10 mg by mouth daily, Disp: , Rfl:     ibuprofen (ADVIL;MOTRIN) 200 MG tablet, Take 200 mg by mouth every 6 hours as needed for Pain., Disp: , Rfl:     gabapentin (NEURONTIN) 100 MG capsule, TK 1 C PO TID (Patient not taking: Reported on 2021), Disp: , Rfl: 10    Allergies:   Allergies   Allergen Reactions    Liver Nausea And Vomiting    Other      Cannot take anything   That is extra strenghth    sensative  To meds    Darvocet [Propoxyphene N-Acetaminophen] Nausea And Vomiting    Shellfish-Derived Products Nausea And Vomiting       Vitals:    21 1554   Weight: 126 lb (57.2 kg)   Height: 5' 5\" (1.651 m)        Past Medical History:   Diagnosis Date    Arthritis     Dementia ([facility] [address]     Hypertension     white coat syndrome    Menopausal sweats 2011    PONV (postoperative nausea and vomiting)     with anesthesia    S/P hip replacement 2011     Family History   Problem Relation Age of Onset    Parkinsonism Brother     Other Sister 10         at the age of 10 d/t appendicitis.     Heart Failure Sister     High Cholesterol Sister      Past Surgical History:   Procedure Laterality Date    HYSTERECTOMY      JOINT REPLACEMENT  nov 2011    TJA right hip with cell saver    NERVE BLOCK Right 4/2/2014    right cervical paravertebral facet  #1    NERVE BLOCK      paravertebral facet    NERVE BLOCK Right 04 16 2014    cervical paravertebral facet #3    NERVE BLOCK Right 5/18/16    cervical facet #1    NERVE BLOCK Right 6/15/16    cervical paravert facet block #2    NERVE BLOCK Bilateral 05/23/2018    greater occipital nerve block with anesthesia #1    NERVE BLOCK Bilateral 06/06/2018    greater occipital #2    NERVE BLOCK Left 07/11/2018    greater occipital radiofrequency    NERVE BLOCK Right 08/15/2018    right occipital nerve radiofrequency    OTHER SURGICAL HISTORY  05/29/14    Radiofrequency right cervical C2-C6    OTHER SURGICAL HISTORY Right 08/31/2016    radiofrequency cervical    OTHER SURGICAL HISTORY Right 11/01/2017    cervical radiofrequency    NV INJECT RX OTHER PERIPH NERVE Left 7/11/2018    LEFT  GREATER OCCIPITAL RADIOFREQUENCY performed by Lanney Landau, DO at [address] Right 8/15/2018    RIGHT  GREATER OCCIPITAL RADIOFREQUENCY performed by Lanney Landau, DO at [address] [facility] NOSE/CLEFT LIP/TIP Bilateral 5/23/2018    BILATERAL GREATER OCCIPITAL NERVE BLOCK #1 UNDER ANESTHESIA performed by Lanney Landau, DO at [address] [facility] NOSE/CLEFT LIP/TIP Bilateral 6/6/2018    BILATERAL GREATER OCCIPITAL NERVE BLOCK #2 UNDER ANESTHESIA [facility] Left 10/18/2016    anterior     Social History     Tobacco Use    Smoking status: Never Smoker    Smokeless tobacco: Never Used   Vaping Use    Vaping Use: Never used   Substance Use Topics    Alcohol use: No    Drug use: No           Diagnostic studies:    No results found.      Procedures:    Debridement of nails 1, 2, 3, 4, 5 right foot and nails 1, 2, 5 left foot was performed with both manual and electrical debridement to prevent infection and/or ulceration. Patient tolerated the debridement well, without any complaints. Patient had no issues with debridement performed on today's visit. Exam:  VASCULAR: Pulses diminished to palpation bilateral foot. Capillary refill time delayed digits 1 through 5 bilateral foot. NEUROLOGICAL: Epicritic sensations intact bilaterally  DERMATOLOGICAL: Digital nails 1, 2, 3, 4, 5 right foot and nails 1, 2, 5 left foot noted to be thickened, dystrophic, discolored, with subungual debris present and tenderness noted to palpation. No maceration of webspaces noted bilateral foot. No plantar calluses or ulcerations noted bilateral foot. Edematous issues noted to both lower extremities with both varicosities and stasis skin changes present bilaterally. MUSCULOSKELETAL: Mild contraction deformities noted lesser digits bilateral foot. Plan Per Assessment  Cynthia Roman was seen today for nail problem. Diagnoses and all orders for this visit:    Onychomycosis    Pain of toe of right foot    Pain of toe of left foot    PVD (peripheral vascular disease) (HCC)    Venous insufficiency (chronic) (peripheral)    Difficulty walking        1. New patient evaluation and management  2. Mycotic nail debridement performed on today's visit as described above to patient tolerance. 3. We did discuss importance of proper foot and skin care techniques to prevent potential issues from occurring. Patient was to wear her graduated compression garments on a daily basis to prevent potential edematous issues from occurring. 4. Patient will be followed up at a later date for continued evaluation and management.       Seen By:    Leonel Bloom DPM    Electronically signed by Leonel Bloom DPM on 9/10/2021 at 8:00 AM      This note was created using

## 2021-11-11 ENCOUNTER — PROCEDURE VISIT (OUTPATIENT)
Dept: PODIATRY | Age: 81
End: 2021-11-11
Payer: MEDICARE

## 2021-11-11 VITALS — WEIGHT: 125 LBS | HEIGHT: 66 IN | BODY MASS INDEX: 20.09 KG/M2

## 2021-11-11 DIAGNOSIS — R26.2 DIFFICULTY WALKING: ICD-10-CM

## 2021-11-11 DIAGNOSIS — M79.675 PAIN OF TOE OF LEFT FOOT: ICD-10-CM

## 2021-11-11 DIAGNOSIS — B35.1 ONYCHOMYCOSIS: Primary | ICD-10-CM

## 2021-11-11 DIAGNOSIS — I73.9 PVD (PERIPHERAL VASCULAR DISEASE) (HCC): ICD-10-CM

## 2021-11-11 DIAGNOSIS — I87.2 VENOUS INSUFFICIENCY (CHRONIC) (PERIPHERAL): ICD-10-CM

## 2021-11-11 DIAGNOSIS — M79.674 PAIN OF TOE OF RIGHT FOOT: ICD-10-CM

## 2021-11-11 PROCEDURE — 11721 DEBRIDE NAIL 6 OR MORE: CPT | Performed by: PODIATRIST

## 2021-11-11 NOTE — PROGRESS NOTES
Patient in today for nail care. Patient does not have any complaints of pain at this time.  Patient's PCP is Mirna Huizar MD date of last ov 11/21/2020          Randal Harrison LPN

## 2021-11-11 NOTE — PROGRESS NOTES
21     Alethea Moraes    : 1940  Sex: female  Age: 78 y.o. Subjective: The patient is seen today for evaluation regarding foot evaluation and mycotic nail care. No other complaints noted. Chief Complaint   Patient presents with    Nail Problem     nail care       Current Medications:    Current Outpatient Medications:     lisinopril (PRINIVIL;ZESTRIL) 10 MG tablet, Take 10 mg by mouth daily, Disp: , Rfl:     ibuprofen (ADVIL;MOTRIN) 200 MG tablet, Take 200 mg by mouth every 6 hours as needed for Pain., Disp: , Rfl:     gabapentin (NEURONTIN) 100 MG capsule, TK 1 C PO TID (Patient not taking: Reported on 2021), Disp: , Rfl: 10    Allergies:   Allergies   Allergen Reactions    Liver Nausea And Vomiting    Other      Cannot take anything   That is extra strenghth    sensative  To meds    Darvocet [Propoxyphene N-Acetaminophen] Nausea And Vomiting    Shellfish-Derived Products Nausea And Vomiting       Past Surgical History:   Procedure Laterality Date    HYSTERECTOMY      JOINT REPLACEMENT  2011    TJA right hip with cell saver    NERVE BLOCK Right 2014    right cervical paravertebral facet  #1    NERVE BLOCK      paravertebral facet    NERVE BLOCK Right 2014    cervical paravertebral facet #3    NERVE BLOCK Right 16    cervical facet #1    NERVE BLOCK Right 6/15/16    cervical paravert facet block #2    NERVE BLOCK Bilateral 2018    greater occipital nerve block with anesthesia #1    NERVE BLOCK Bilateral 2018    greater occipital #2    NERVE BLOCK Left 2018    greater occipital radiofrequency    NERVE BLOCK Right 08/15/2018    right occipital nerve radiofrequency    OTHER SURGICAL HISTORY  14    Radiofrequency right cervical C2-C6    OTHER SURGICAL HISTORY Right 2016    radiofrequency cervical    OTHER SURGICAL HISTORY Right 2017    cervical radiofrequency    NE INJECT RX OTHER PERIPH NERVE Left 2018    LEFT GREATER OCCIPITAL RADIOFREQUENCY performed by Porter Centeno DO at [address] Right 8/15/2018    RIGHT  GREATER OCCIPITAL RADIOFREQUENCY performed by Porter Centeno DO at [address] [facility] NOSE/CLEFT LIP/TIP Bilateral 5/23/2018    BILATERAL GREATER OCCIPITAL NERVE BLOCK #1 UNDER ANESTHESIA performed by Porter Centeno DO at [address] [facility] NOSE/CLEFT LIP/TIP Bilateral 6/6/2018    BILATERAL GREATER OCCIPITAL NERVE BLOCK #2 UNDER ANESTHESIA performed by Porter Centeno DO at [address] Left 10/18/2016    anterior     Past Medical History:   Diagnosis Date    Arthritis     [facility] ([facility] [address]     Hypertension     white coat syndrome    Menopausal sweats 11/8/2011    PONV (postoperative nausea and vomiting)     with anesthesia    S/P hip replacement 11/8/2011       Vitals:    11/11/21 1102   Weight: 125 lb (56.7 kg)   Height: 5' 5.5\" (1.664 m)       Exam:  Pedal pulses diminished to palpation bilateral foot. Capillary refill time delayed digital regions bilateral foot. At this time the nail/s 1, 2, 3, 4, 5 right foot and nail/s 1, 2, 5 left foot are noted to be thickened, dystrophic and discolored with subungual debris present. Tenderness noted to palpation. Minimal hair growth is noted to both lower extremities. Edema noted with both varicosities and stasis skin changes present bilaterally. Coolness is noted to the digital regions to palpation. Capillary fill time delayed digital areas bilateral foot. No heel fissuring or macerations of the web spaces. No plantar calluses and/or ulcerative areas are noted. Patient is having difficulty with gait/walking. Plan Per Assessment  Raad Call was seen today for nail problem.     Diagnoses and all orders for this visit:    Onychomycosis    Pain of toe of right foot    Pain of toe of left foot    PVD (peripheral vascular disease) ([facility] [address]    Venous insufficiency (chronic) (peripheral)    Difficulty walking        1. Evaluation and Management  2. Manual and electrical debridement of the mycotic nails was performed for thickness and length to prevent injection and/or ulceration. 3. I recommended antifungal cream to the nails daily. 4. It was discussed in detail with the patient proper caring for the vascular compromised foot. The fact that they have compromised blood flow put the patient at risk for infection/gangrene/amputation. The patient should not walk barefoot. Shoe gear should fit properly and socks should be worn with shoes. Exercise is very important to prevent worsening of the disease process but before performing an exercise program should check with their family physician first.  If any skin lesions are noted, they are instructed to contact the office immediately. 5. We will see the patient back at a later date for continued podiatric management and care. Patient was advised to call the office with any questions or concerns prior to their next appointment if needed. Seen By:    Matthew Smith DPM    Electronically signed by Matthew Smith DPM on 11/11/2021 at 11:18 AM      This note was created using voice recognition software. The note was reviewed however may contain grammatical errors.

## 2021-11-15 ENCOUNTER — TELEPHONE (OUTPATIENT)
Dept: PODIATRY | Age: 81
End: 2021-11-15

## 2021-11-15 DIAGNOSIS — I87.2 VENOUS INSUFFICIENCY (CHRONIC) (PERIPHERAL): ICD-10-CM

## 2021-11-15 DIAGNOSIS — I73.9 PVD (PERIPHERAL VASCULAR DISEASE) (HCC): Primary | ICD-10-CM

## 2021-11-15 NOTE — TELEPHONE ENCOUNTER
Patients daughter called in requesting lotion for her mother's feet that was talked about during their last appointment to be sent in to the [facility] pharmacy in [place]. Thank you.

## 2021-11-16 RX ORDER — AMMONIUM LACTATE 12 G/100G
LOTION TOPICAL
Qty: 222 ML | Refills: 5 | Status: SHIPPED | OUTPATIENT
Start: 2021-11-16

## 2021-11-30 DIAGNOSIS — M25.562 ACUTE PAIN OF LEFT KNEE: Primary | ICD-10-CM

## 2021-12-01 ENCOUNTER — OFFICE VISIT (OUTPATIENT)
Dept: ORTHOPEDIC SURGERY | Age: 81
End: 2021-12-01
Payer: MEDICARE

## 2021-12-01 VITALS — WEIGHT: 125 LBS | BODY MASS INDEX: 20.83 KG/M2 | HEIGHT: 65 IN | TEMPERATURE: 98 F

## 2021-12-01 DIAGNOSIS — M17.12 PRIMARY OSTEOARTHRITIS OF LEFT KNEE: Primary | ICD-10-CM

## 2021-12-01 PROCEDURE — 1036F TOBACCO NON-USER: CPT | Performed by: ORTHOPAEDIC SURGERY

## 2021-12-01 PROCEDURE — G8400 PT W/DXA NO RESULTS DOC: HCPCS | Performed by: ORTHOPAEDIC SURGERY

## 2021-12-01 PROCEDURE — G8427 DOCREV CUR MEDS BY ELIG CLIN: HCPCS | Performed by: ORTHOPAEDIC SURGERY

## 2021-12-01 PROCEDURE — G8420 CALC BMI NORM PARAMETERS: HCPCS | Performed by: ORTHOPAEDIC SURGERY

## 2021-12-01 PROCEDURE — 1123F ACP DISCUSS/DSCN MKR DOCD: CPT | Performed by: ORTHOPAEDIC SURGERY

## 2021-12-01 PROCEDURE — 99213 OFFICE O/P EST LOW 20 MIN: CPT | Performed by: ORTHOPAEDIC SURGERY

## 2021-12-01 PROCEDURE — 1090F PRES/ABSN URINE INCON ASSESS: CPT | Performed by: ORTHOPAEDIC SURGERY

## 2021-12-01 PROCEDURE — 4040F PNEUMOC VAC/ADMIN/RCVD: CPT | Performed by: ORTHOPAEDIC SURGERY

## 2021-12-01 PROCEDURE — G8484 FLU IMMUNIZE NO ADMIN: HCPCS | Performed by: ORTHOPAEDIC SURGERY

## 2021-12-01 NOTE — PROGRESS NOTES
Rakel Yusuf is a 78 y.o. female, who presents   Chief Complaint   Patient presents with    Knee Pain     Left Knee, x couple months, no known injury, no previous surgery. HPI[de-identified] Left knee pain is been present for some time. The exact interval is not known. Mrs. Hernández is a boisterous lady who is quite vocal but does not always stay on subject. She says that her knee is doing pretty well right now. She has apparently complained in the past of difficulty particularly on stairs. I noted in the previous record that she had Visco supplement injection of her right knee in 2019. Allergies; medications; past medical, surgical, family, and social history; and problem list have been reviewed today and updated as indicated in this encounter - see below following Ortho specifics. Musculoskeletal: Skin condition gross neurovascular function is good in the left lower extremity. Patient walks with a pretty peppy gait. Range of motion of the left knee is 0 to 110 degrees or more flexion. She has only slight medial and lateral laxity. There is no suggestion of collateral or cruciate injury. Her patellofemoral alignment is good. Meniscal examination is grossly unremarkable now. There is a slight amount of crepitus in the range of motion. Radiologic Studies: Imaging studies of the left knee in 2 views including AP weightbearing shows slight medial narrowing of the knee joint and mild osteopenia with no other grossly abnormal findings. ASSESSMENT:  Mc Ibrahim was seen today for knee pain. Diagnoses and all orders for this visit:    Primary osteoarthritis of left knee     Treatment alternatives were reviewed including medical and physical therapies, injections, and surgical options, expected risks benefits and likely outcome of each were discussed in detail, questions asked and answered and understood. Discussed the symptoms as well as physical findings and imaging results. An attendant was with her.   It seems that her pain is minimal at this time and I would recommend against any invasive procedures. She should continue activity as tolerated    PLAN: Symptomatic treatment follow-up as needed.         Patient Active Problem List   Diagnosis    Degenerative arthritis of hip R    Menopausal sweats    S/P hip replacement    Degenerativ osteoarthritis    Cervical facet syndrome, right greater than left    Protruded cervical disc    DDD (degenerative disc disease), cervical    Neural foraminal stenosis of cervical spine    Greater Occipital neuralgia    Facet arthropathy, cervical    Onychomycosis    PVD (peripheral vascular disease) ([address]    Venous insufficiency (chronic) (peripheral)    Difficulty walking       Past Medical History:   Diagnosis Date    Arthritis     Dementia ([address]     Hypertension     white coat syndrome    Menopausal sweats 11/8/2011    PONV (postoperative nausea and vomiting)     with anesthesia    S/P hip replacement 11/8/2011       Past Surgical History:   Procedure Laterality Date    HYSTERECTOMY      JOINT REPLACEMENT  nov 2011    TJA right hip with cell saver    NERVE BLOCK Right 4/2/2014    right cervical paravertebral facet  #1    NERVE BLOCK      paravertebral facet    NERVE BLOCK Right 04 16 2014    cervical paravertebral facet #3    NERVE BLOCK Right 5/18/16    cervical facet #1    NERVE BLOCK Right 6/15/16    cervical paravert facet block #2    NERVE BLOCK Bilateral 05/23/2018    greater occipital nerve block with anesthesia #1    NERVE BLOCK Bilateral 06/06/2018    greater occipital #2    NERVE BLOCK Left 07/11/2018    greater occipital radiofrequency    NERVE BLOCK Right 08/15/2018    right occipital nerve radiofrequency    OTHER SURGICAL HISTORY  05/29/14    Radiofrequency right cervical C2-C6    OTHER SURGICAL HISTORY Right 08/31/2016    radiofrequency cervical    OTHER SURGICAL HISTORY Right 11/01/2017    cervical radiofrequency    [place] INJECT RX OTHER PERIPH NERVE Left 7/11/2018    LEFT  GREATER OCCIPITAL RADIOFREQUENCY performed by Hailey Carrasco DO at [address] Right 8/15/2018    RIGHT  GREATER OCCIPITAL RADIOFREQUENCY performed by Hailey Carrasco DO at [address] [facility] NOSE/CLEFT LIP/TIP Bilateral 5/23/2018    BILATERAL GREATER OCCIPITAL NERVE BLOCK #1 UNDER ANESTHESIA performed by Hailey Carrasco DO at [address] [facility] NOSE/CLEFT LIP/TIP Bilateral 6/6/2018    BILATERAL GREATER OCCIPITAL NERVE BLOCK #2 UNDER ANESTHESIA [facility] Left 10/18/2016    anterior       Current Outpatient Medications   Medication Sig Dispense Refill    ammonium lactate (LAC-HYDRIN) 12 % lotion Apply topically daily. 222 mL 5    gabapentin (NEURONTIN) 100 MG capsule TK 1 C PO TID  10    lisinopril (PRINIVIL;ZESTRIL) 10 MG tablet Take 10 mg by mouth daily      ibuprofen (ADVIL;MOTRIN) 200 MG tablet Take 200 mg by mouth every 6 hours as needed for Pain. No current facility-administered medications for this visit. Allergies   Allergen Reactions    Liver Nausea And Vomiting    Other      Cannot take anything   That is extra strenghth    sensative  To meds    Darvocet [Propoxyphene N-Acetaminophen] Nausea And Vomiting    Shellfish-Derived Products Nausea And Vomiting       Social History     Socioeconomic History    Marital status:       Spouse name: None    Number of children: None    Years of education: None    Highest education level: None   Occupational History    None   Tobacco Use    Smoking status: Never Smoker    Smokeless tobacco: Never Used   Vaping Use    Vaping Use: Never used   Substance and Sexual Activity    Alcohol use: No    Drug use: No    Sexual activity: None   Other Topics Concern    None   Social History Narrative    None     Social Determinants of Health     Financial Resource Strain:  Difficulty of Paying Living Expenses: Not on file   Food Insecurity:     Worried About Running Out of Food in the Last Year: Not on file    Ran Out of Food in the Last Year: Not on file   Transportation Needs:     Lack of Transportation (Medical): Not on file    Lack of Transportation (Non-Medical): Not on file   Physical Activity:     Days of Exercise per Week: Not on file    Minutes of Exercise per Session: Not on file   Stress:     Feeling of Stress : Not on file   Social Connections:     Frequency of Communication with Friends and Family: Not on file    Frequency of Social Gatherings with Friends and Family: Not on file    Attends Mandaen Services: Not on file    Active Member of [address] [place] [address] [employer] or Organizations: Not on file    Attends Club or Organization Meetings: Not on file    Marital Status: Not on file   Intimate Partner Violence:     Fear of Current or Ex-Partner: Not on file    Emotionally Abused: Not on file    Physically Abused: Not on file    Sexually Abused: Not on file   Housing Stability:     Unable to Pay for Housing in the Last Year: Not on file    Number of [place] in the Last Year: Not on file    Unstable Housing in the Last Year: Not on file       Family History   Problem Relation Age of Onset    Parkinsonism Brother     Other Sister 6         at the age of 10 d/t appendicitis.  Heart Failure Sister     High Cholesterol Sister          Review of Systems:   As follows except as previously noted in HPI:  Constitutional: Negative for chills, diaphoresis,  fever   Respiratory: Negative for cough, shortness of breath and wheezing. Cardiovascular: Negative for chest pain and palpitations. Neurological: Negative for dizziness, syncope,   GI / : abdominal pain or cramping  Musculoskeletal: see HPI       Objective:   Physical Exam   Constitutional: Oriented to person, place, and time.  and appears well-developed and well-nourished. :   Head: Normocephalic and atraumatic. Neck: Neck supple. Eyes: EOM are normal.   Pulmonary/Chest: Effort normal.  No respiratory distress, no wheezes. Neurological: Alert and oriented to person  Skin: Skin is warm and dry. Sonu Bosch DO    12/1/21  4:38 PM    All reasonable efforts have been made to minimize the risk of errors that may occur in the use of voice recognition and other electronic means of charting.

## 2022-01-27 ENCOUNTER — PROCEDURE VISIT (OUTPATIENT)
Dept: PODIATRY | Age: 82
End: 2022-01-27
Payer: MEDICARE

## 2022-01-27 VITALS — WEIGHT: 125 LBS | BODY MASS INDEX: 20.83 KG/M2 | HEIGHT: 65 IN

## 2022-01-27 DIAGNOSIS — B35.1 ONYCHOMYCOSIS: Primary | ICD-10-CM

## 2022-01-27 DIAGNOSIS — R26.2 DIFFICULTY WALKING: ICD-10-CM

## 2022-01-27 DIAGNOSIS — I87.2 VENOUS INSUFFICIENCY (CHRONIC) (PERIPHERAL): ICD-10-CM

## 2022-01-27 DIAGNOSIS — I73.9 PVD (PERIPHERAL VASCULAR DISEASE) (HCC): ICD-10-CM

## 2022-01-27 DIAGNOSIS — M79.675 PAIN OF TOE OF LEFT FOOT: ICD-10-CM

## 2022-01-27 DIAGNOSIS — M79.674 PAIN OF TOE OF RIGHT FOOT: ICD-10-CM

## 2022-01-27 PROCEDURE — 11721 DEBRIDE NAIL 6 OR MORE: CPT | Performed by: PODIATRIST

## 2022-01-27 NOTE — PROGRESS NOTES
22     [facility] [place]    : 1940  Sex: female  Age: 78 y.o. Subjective: The patient is seen today for evaluation regarding foot evaluation and mycotic nail care. No other complaints noted. Chief Complaint   Patient presents with    Nail Problem     nail care       Current Medications:    Current Outpatient Medications:     ammonium lactate (LAC-HYDRIN) 12 % lotion, Apply topically daily. , Disp: 222 mL, Rfl: 5    gabapentin (NEURONTIN) 100 MG capsule, TK 1 C PO TID, Disp: , Rfl: 10    lisinopril (PRINIVIL;ZESTRIL) 10 MG tablet, Take 10 mg by mouth daily, Disp: , Rfl:     ibuprofen (ADVIL;MOTRIN) 200 MG tablet, Take 200 mg by mouth every 6 hours as needed for Pain., Disp: , Rfl:     Allergies:   Allergies   Allergen Reactions    Liver Nausea And Vomiting    Other      Cannot take anything   That is extra strenghth    sensative  To meds    Darvocet [Propoxyphene N-Acetaminophen] Nausea And Vomiting    Shellfish-Derived Products Nausea And Vomiting       Past Surgical History:   Procedure Laterality Date    HYSTERECTOMY      JOINT REPLACEMENT  2011    TJA right hip with cell saver    NERVE BLOCK Right 2014    right cervical paravertebral facet  #1    NERVE BLOCK      paravertebral facet    NERVE BLOCK Right 2014    cervical paravertebral facet #3    NERVE BLOCK Right 16    cervical facet #1    NERVE BLOCK Right 6/15/16    cervical paravert facet block #2    NERVE BLOCK Bilateral 2018    greater occipital nerve block with anesthesia #1    NERVE BLOCK Bilateral 2018    greater occipital #2    NERVE BLOCK Left 2018    greater occipital radiofrequency    NERVE BLOCK Right 08/15/2018    right occipital nerve radiofrequency    OTHER SURGICAL HISTORY  14    Radiofrequency right cervical C2-C6    OTHER SURGICAL HISTORY Right 2016    radiofrequency cervical    OTHER SURGICAL HISTORY Right 2017    cervical radiofrequency    [place] INJECT RX vascular disease) ([facility] [address]    Venous insufficiency (chronic) (peripheral)    Difficulty walking        1. Evaluation and Management  2. Manual and electrical debridement of the mycotic nails was performed for thickness and length to prevent injection and/or ulceration. 3. I recommended antifungal cream to the nails daily. 4. It was discussed in detail with the patient proper caring for the vascular compromised foot. The fact that they have compromised blood flow put the patient at risk for infection/gangrene/amputation. The patient should not walk barefoot. Shoe gear should fit properly and socks should be worn with shoes. Exercise is very important to prevent worsening of the disease process but before performing an exercise program should check with their family physician first.  If any skin lesions are noted, they are instructed to contact the office immediately. 5. We will see the patient back at a later date for continued podiatric management and care. Patient was advised to call the office with any questions or concerns prior to their next appointment if needed. Seen By:    Pelon Izquierdo DPM    Electronically signed by Pelon Izquierdo DPM on 1/27/2022 at 2:03 PM      This note was created using voice recognition software. The note was reviewed however may contain grammatical errors.

## 2022-01-27 NOTE — PROGRESS NOTES
Patient in today for nail care. Patient does not have any complaints of pain at this time.  Patient's PCP is Laura Aguilar MD date of last ov 11/21/2020        David Peterson LPN

## 2022-02-24 ENCOUNTER — APPOINTMENT (OUTPATIENT)
Dept: CT IMAGING | Age: 82
End: 2022-02-24
Payer: MEDICARE

## 2022-02-24 ENCOUNTER — APPOINTMENT (OUTPATIENT)
Dept: GENERAL RADIOLOGY | Age: 82
End: 2022-02-24
Payer: MEDICARE

## 2022-02-24 ENCOUNTER — HOSPITAL ENCOUNTER (OUTPATIENT)
Age: 82
Setting detail: OBSERVATION
Discharge: NURSING, REHAB OR LONG-TERM CARE | End: 2022-02-26
Attending: STUDENT IN AN ORGANIZED HEALTH CARE EDUCATION/TRAINING PROGRAM | Admitting: INTERNAL MEDICINE
Payer: MEDICARE

## 2022-02-24 DIAGNOSIS — R41.82 ALTERED MENTAL STATUS, UNSPECIFIED ALTERED MENTAL STATUS TYPE: Primary | ICD-10-CM

## 2022-02-24 PROBLEM — R41.0 CONFUSION: Status: ACTIVE | Noted: 2022-02-24

## 2022-02-24 LAB
ALBUMIN SERPL-MCNC: 4.1 G/DL (ref 3.5–5.2)
ALP BLD-CCNC: 87 U/L (ref 35–104)
ALT SERPL-CCNC: 12 U/L (ref 0–32)
ANION GAP SERPL CALCULATED.3IONS-SCNC: 12 MMOL/L (ref 7–16)
AST SERPL-CCNC: 22 U/L (ref 0–31)
BACTERIA: ABNORMAL /HPF
BASOPHILS ABSOLUTE: 0.15 E9/L (ref 0–0.2)
BASOPHILS RELATIVE PERCENT: 0.9 % (ref 0–2)
BILIRUB SERPL-MCNC: 0.9 MG/DL (ref 0–1.2)
BILIRUBIN URINE: NEGATIVE
BLOOD, URINE: NEGATIVE
BUN BLDV-MCNC: 14 MG/DL (ref 6–23)
CALCIUM SERPL-MCNC: 9.4 MG/DL (ref 8.6–10.2)
CHLORIDE BLD-SCNC: 96 MMOL/L (ref 98–107)
CHP ED QC CHECK: YES
CLARITY: CLEAR
CO2: 27 MMOL/L (ref 22–29)
COLOR: YELLOW
CREAT SERPL-MCNC: 0.7 MG/DL (ref 0.5–1)
EKG ATRIAL RATE: 78 BPM
EKG P AXIS: 36 DEGREES
EKG P-R INTERVAL: 174 MS
EKG Q-T INTERVAL: 378 MS
EKG QRS DURATION: 82 MS
EKG QTC CALCULATION (BAZETT): 430 MS
EKG R AXIS: -11 DEGREES
EKG T AXIS: 66 DEGREES
EKG VENTRICULAR RATE: 78 BPM
EOSINOPHILS ABSOLUTE: 0 E9/L (ref 0.05–0.5)
EOSINOPHILS RELATIVE PERCENT: 0.1 % (ref 0–6)
EPITHELIAL CELLS, UA: ABNORMAL /HPF
GFR AFRICAN AMERICAN: >60
GFR NON-AFRICAN AMERICAN: >60 ML/MIN/1.73
GLUCOSE BLD-MCNC: 171 MG/DL
GLUCOSE BLD-MCNC: 196 MG/DL (ref 74–99)
GLUCOSE URINE: NEGATIVE MG/DL
HCT VFR BLD CALC: 45.1 % (ref 34–48)
HEMOGLOBIN: 14.8 G/DL (ref 11.5–15.5)
INFLUENZA A BY PCR: NOT DETECTED
INFLUENZA B BY PCR: NOT DETECTED
KETONES, URINE: ABNORMAL MG/DL
LACTIC ACID, SEPSIS: 1.5 MMOL/L (ref 0.5–1.9)
LEUKOCYTE ESTERASE, URINE: NEGATIVE
LIPASE: 18 U/L (ref 13–60)
LYMPHOCYTES ABSOLUTE: 6.05 E9/L (ref 1.5–4)
LYMPHOCYTES RELATIVE PERCENT: 36.3 % (ref 20–42)
MCH RBC QN AUTO: 31.2 PG (ref 26–35)
MCHC RBC AUTO-ENTMCNC: 32.8 % (ref 32–34.5)
MCV RBC AUTO: 94.9 FL (ref 80–99.9)
METER GLUCOSE: 171 MG/DL (ref 74–99)
MONOCYTES ABSOLUTE: 1.68 E9/L (ref 0.1–0.95)
MONOCYTES RELATIVE PERCENT: 9.7 % (ref 2–12)
NEUTROPHILS ABSOLUTE: 8.9 E9/L (ref 1.8–7.3)
NEUTROPHILS RELATIVE PERCENT: 53.1 % (ref 43–80)
NITRITE, URINE: NEGATIVE
OVALOCYTES: ABNORMAL
PDW BLD-RTO: 12.9 FL (ref 11.5–15)
PH UA: 7 (ref 5–9)
PLATELET # BLD: 134 E9/L (ref 130–450)
PMV BLD AUTO: 12.2 FL (ref 7–12)
POIKILOCYTES: ABNORMAL
POLYCHROMASIA: ABNORMAL
POTASSIUM REFLEX MAGNESIUM: 4.6 MMOL/L (ref 3.5–5)
PROTEIN UA: NEGATIVE MG/DL
RBC # BLD: 4.75 E12/L (ref 3.5–5.5)
RBC UA: ABNORMAL /HPF (ref 0–2)
REASON FOR REJECTION: NORMAL
REJECTED TEST: NORMAL
SARS-COV-2, NAAT: NOT DETECTED
SODIUM BLD-SCNC: 135 MMOL/L (ref 132–146)
SPECIFIC GRAVITY UA: 1.01 (ref 1–1.03)
TOTAL PROTEIN: 7.5 G/DL (ref 6.4–8.3)
TROPONIN, HIGH SENSITIVITY: 11 NG/L (ref 0–9)
TROPONIN, HIGH SENSITIVITY: 11 NG/L (ref 0–9)
UROBILINOGEN, URINE: 2 E.U./DL
WBC # BLD: 16.8 E9/L (ref 4.5–11.5)
WBC UA: ABNORMAL /HPF (ref 0–5)

## 2022-02-24 PROCEDURE — 6370000000 HC RX 637 (ALT 250 FOR IP): Performed by: INTERNAL MEDICINE

## 2022-02-24 PROCEDURE — 83605 ASSAY OF LACTIC ACID: CPT

## 2022-02-24 PROCEDURE — 6370000000 HC RX 637 (ALT 250 FOR IP): Performed by: STUDENT IN AN ORGANIZED HEALTH CARE EDUCATION/TRAINING PROGRAM

## 2022-02-24 PROCEDURE — 87040 BLOOD CULTURE FOR BACTERIA: CPT

## 2022-02-24 PROCEDURE — 87635 SARS-COV-2 COVID-19 AMP PRB: CPT

## 2022-02-24 PROCEDURE — 80053 COMPREHEN METABOLIC PANEL: CPT

## 2022-02-24 PROCEDURE — 74176 CT ABD & PELVIS W/O CONTRAST: CPT

## 2022-02-24 PROCEDURE — P9612 CATHETERIZE FOR URINE SPEC: HCPCS

## 2022-02-24 PROCEDURE — 85025 COMPLETE CBC W/AUTO DIFF WBC: CPT

## 2022-02-24 PROCEDURE — 36415 COLL VENOUS BLD VENIPUNCTURE: CPT

## 2022-02-24 PROCEDURE — 83690 ASSAY OF LIPASE: CPT

## 2022-02-24 PROCEDURE — 70450 CT HEAD/BRAIN W/O DYE: CPT

## 2022-02-24 PROCEDURE — 2580000003 HC RX 258: Performed by: STUDENT IN AN ORGANIZED HEALTH CARE EDUCATION/TRAINING PROGRAM

## 2022-02-24 PROCEDURE — 72125 CT NECK SPINE W/O DYE: CPT

## 2022-02-24 PROCEDURE — 84484 ASSAY OF TROPONIN QUANT: CPT

## 2022-02-24 PROCEDURE — G0378 HOSPITAL OBSERVATION PER HR: HCPCS

## 2022-02-24 PROCEDURE — 71045 X-RAY EXAM CHEST 1 VIEW: CPT

## 2022-02-24 PROCEDURE — 87502 INFLUENZA DNA AMP PROBE: CPT

## 2022-02-24 PROCEDURE — 2580000003 HC RX 258: Performed by: INTERNAL MEDICINE

## 2022-02-24 PROCEDURE — 81001 URINALYSIS AUTO W/SCOPE: CPT

## 2022-02-24 PROCEDURE — 93005 ELECTROCARDIOGRAM TRACING: CPT | Performed by: STUDENT IN AN ORGANIZED HEALTH CARE EDUCATION/TRAINING PROGRAM

## 2022-02-24 PROCEDURE — 99285 EMERGENCY DEPT VISIT HI MDM: CPT

## 2022-02-24 PROCEDURE — 74150 CT ABDOMEN W/O CONTRAST: CPT

## 2022-02-24 PROCEDURE — 82962 GLUCOSE BLOOD TEST: CPT

## 2022-02-24 RX ORDER — NICOTINE POLACRILEX 4 MG
15 LOZENGE BUCCAL PRN
Status: DISCONTINUED | OUTPATIENT
Start: 2022-02-24 | End: 2022-02-26 | Stop reason: HOSPADM

## 2022-02-24 RX ORDER — SODIUM CHLORIDE 0.9 % (FLUSH) 0.9 %
5-40 SYRINGE (ML) INJECTION PRN
Status: DISCONTINUED | OUTPATIENT
Start: 2022-02-24 | End: 2022-02-26 | Stop reason: HOSPADM

## 2022-02-24 RX ORDER — ACETAMINOPHEN 325 MG/1
650 TABLET ORAL ONCE
Status: COMPLETED | OUTPATIENT
Start: 2022-02-24 | End: 2022-02-24

## 2022-02-24 RX ORDER — POLYETHYLENE GLYCOL 3350 17 G/17G
17 POWDER, FOR SOLUTION ORAL DAILY PRN
Status: DISCONTINUED | OUTPATIENT
Start: 2022-02-24 | End: 2022-02-26 | Stop reason: HOSPADM

## 2022-02-24 RX ORDER — SODIUM CHLORIDE 9 MG/ML
25 INJECTION, SOLUTION INTRAVENOUS PRN
Status: DISCONTINUED | OUTPATIENT
Start: 2022-02-24 | End: 2022-02-26 | Stop reason: HOSPADM

## 2022-02-24 RX ORDER — SENNA AND DOCUSATE SODIUM 50; 8.6 MG/1; MG/1
2 TABLET, FILM COATED ORAL 2 TIMES DAILY
Status: DISCONTINUED | OUTPATIENT
Start: 2022-02-24 | End: 2022-02-26 | Stop reason: HOSPADM

## 2022-02-24 RX ORDER — DEXTROSE MONOHYDRATE 50 MG/ML
100 INJECTION, SOLUTION INTRAVENOUS PRN
Status: DISCONTINUED | OUTPATIENT
Start: 2022-02-24 | End: 2022-02-26 | Stop reason: HOSPADM

## 2022-02-24 RX ORDER — SODIUM CHLORIDE 0.9 % (FLUSH) 0.9 %
5-40 SYRINGE (ML) INJECTION EVERY 12 HOURS SCHEDULED
Status: DISCONTINUED | OUTPATIENT
Start: 2022-02-24 | End: 2022-02-26 | Stop reason: HOSPADM

## 2022-02-24 RX ORDER — IBUPROFEN 600 MG/1
600 TABLET ORAL EVERY 8 HOURS PRN
COMMUNITY

## 2022-02-24 RX ORDER — ACETAMINOPHEN 650 MG/1
650 SUPPOSITORY RECTAL EVERY 6 HOURS PRN
Status: DISCONTINUED | OUTPATIENT
Start: 2022-02-24 | End: 2022-02-26 | Stop reason: HOSPADM

## 2022-02-24 RX ORDER — SODIUM CHLORIDE, SODIUM LACTATE, POTASSIUM CHLORIDE, AND CALCIUM CHLORIDE .6; .31; .03; .02 G/100ML; G/100ML; G/100ML; G/100ML
1000 INJECTION, SOLUTION INTRAVENOUS ONCE
Status: COMPLETED | OUTPATIENT
Start: 2022-02-24 | End: 2022-02-24

## 2022-02-24 RX ORDER — ACETAMINOPHEN 325 MG/1
650 TABLET ORAL EVERY 6 HOURS PRN
Status: DISCONTINUED | OUTPATIENT
Start: 2022-02-24 | End: 2022-02-26 | Stop reason: HOSPADM

## 2022-02-24 RX ORDER — DEXTROSE MONOHYDRATE 25 G/50ML
12.5 INJECTION, SOLUTION INTRAVENOUS PRN
Status: DISCONTINUED | OUTPATIENT
Start: 2022-02-24 | End: 2022-02-24 | Stop reason: RX

## 2022-02-24 RX ADMIN — ACETAMINOPHEN 650 MG: 325 TABLET ORAL at 17:44

## 2022-02-24 RX ADMIN — SENNOSIDES AND DOCUSATE SODIUM 2 TABLET: 50; 8.6 TABLET ORAL at 21:18

## 2022-02-24 RX ADMIN — SODIUM CHLORIDE, POTASSIUM CHLORIDE, SODIUM LACTATE AND CALCIUM CHLORIDE 1000 ML: 600; 310; 30; 20 INJECTION, SOLUTION INTRAVENOUS at 12:36

## 2022-02-24 RX ADMIN — SODIUM CHLORIDE, PRESERVATIVE FREE 10 ML: 5 INJECTION INTRAVENOUS at 21:18

## 2022-02-24 ASSESSMENT — PAIN SCALES - GENERAL: PAINLEVEL_OUTOF10: 0

## 2022-02-24 ASSESSMENT — ENCOUNTER SYMPTOMS: TACHYPNEA: 1

## 2022-02-24 NOTE — ED PROVIDER NOTES
ED  Provider Note  Admit Date/RoomTime: 2/24/2022 10:54 AM  ED Room: [id number]      History of Present Illness:  2/24/22, Time: 10:54 AM EST  Chief Complaint   Patient presents with    Altered Mental Status     to er via ems from home for evaluation of wordening confusion. the pt is usually oriented x 2 and has alzhiemers diaease. confusion is getting worse per family per ems.  Fatigue     per ems the pt sat down and was unable to get up on own. Susan Horta is a 78 y.o. female presenting to the ED for AMS. Increased confusion x days, two weeks of cough, persistent, mild, no exac or relieving factors, also increased urinary frequency. Two episodes vom today. Patient has extensive cervical osteoarthritis per daughter patient provides collateral history, and has had worsening pain in this region as well. No recent falls or trauma. Patient is normally ANO x2, has been increasingly confused as above. No diarrhea constipation, patient denies any headache or vision changes, neck pain or stiffness at this time, chest pain or shortness of breath, abdominal pain. Genralized weakness today. Onset: Gradual  Timing: Persistent  Duration: Days as above,   Associated symptoms: No fevers or chills. Recently trialed on benzodiazepines, but had paradoxical effect. No other med changes. Severity: Mild to moderate  Exacerbated by: None none  Relieved by: none       Review of Systems:   A complete review of systems was performed and pertinent positives and negatives are stated within HPI, all other systems reviewed and are negative.        --------------------------------------------- PATIENT HISTORY--------------------------------------------  Past Medical History:  has a past medical history of Arthritis, Dementia ([address] Hypertension, Menopausal sweats, PONV (postoperative nausea and vomiting), and S/P hip replacement. Past Surgical History:  has a past surgical history that includes Hysterectomy;  Nerve Block (Right, 4/2/2014); Nerve Block; Nerve Block (Right, 04 16 2014); other surgical history (05/29/14); Nerve Block (Right, 5/18/16); Nerve Block (Right, 6/15/16); other surgical history (Right, 08/31/2016); joint replacement (nov 2011); Total hip arthroplasty (Left, 10/18/2016); other surgical history (Right, 11/01/2017); Nerve Block (Bilateral, 05/23/2018); pr dallin nose/cleft lip/tip (Bilateral, 5/23/2018); Nerve Block (Bilateral, 06/06/2018); pr dallin nose/cleft lip/tip (Bilateral, 6/6/2018); Nerve Block (Left, 07/11/2018); pr inject rx other periph nerve (Left, 7/11/2018); Nerve Block (Right, 08/15/2018); and pr inject rx other periph nerve (Right, 8/15/2018). Family History: family history includes Heart Failure in her sister; High Cholesterol in her sister; Other (age of onset: 10) in her sister; Parkinsonism in her brother. . Unless otherwise noted, family history is non contributory    Social History:  reports that she has never smoked. She has never used smokeless tobacco. She reports that she does not drink alcohol and does not use drugs. The patients home medications have been reviewed. Allergies: Liver, Other, Darvocet [propoxyphene n-acetaminophen], and Shellfish-derived products    I have reviewed the past medical history, past surgical history, social history, and family history    ---------------------------------------------------PHYSICAL EXAM--------------------------------------    Constitutional/General: Alert and oriented x1  Head: Normocephalic and atraumatic  Eyes: PERRL, EOMI, sclera non icteric  ENT: Oropharynx clear, handling secretions, no trismus  Neck: Supple, full ROM, no stridor, no meningismus  Respiratory: lctab  Cardiovascular: RRR, no R/G/M, 2+ peripheral pulses  Chest: No chest wall tenderness, equal chest rise  Gastrointestinal:  sntnd  Musculoskeletal: Extremities warm and well perfused, moving all extremities  Skin: skin warm and dry. No rashes.    Neurologic: No focal deficits, strength and sensation grossly intact, 4/5 strength UE and LE symmetric  Psychiatric: flat affect            -------------------------------------------------- RESULTS -------------------------------------------------  I have personally reviewed all laboratory and imaging results for this patient. Results are listed below.      LABS: (Lab results interpreted by me)  Results for orders placed or performed during the hospital encounter of 02/24/22   COVID-19, Rapid    Specimen: Nasopharyngeal Swab   Result Value Ref Range    SARS-CoV-2, NAAT Not Detected Not Detected   RAPID INFLUENZA A/B ANTIGENS    Specimen: Nasopharyngeal   Result Value Ref Range    Influenza A by PCR Not Detected Not Detected    Influenza B by PCR Not Detected Not Detected   CBC with Auto Differential   Result Value Ref Range    WBC 16.8 (H) 4.5 - 11.5 E9/L    RBC 4.75 3.50 - 5.50 E12/L    Hemoglobin 14.8 11.5 - 15.5 g/dL    Hematocrit 45.1 34.0 - 48.0 %    MCV 94.9 80.0 - 99.9 fL    MCH 31.2 26.0 - 35.0 pg    MCHC 32.8 32.0 - 34.5 %    RDW 12.9 11.5 - 15.0 fL    Platelets 088 548 - 556 E9/L    MPV 12.2 (H) 7.0 - 12.0 fL    Neutrophils % 53.1 43.0 - 80.0 %    Lymphocytes % 36.3 20.0 - 42.0 %    Monocytes % 9.7 2.0 - 12.0 %    Eosinophils % 0.1 0.0 - 6.0 %    Basophils % 0.9 0.0 - 2.0 %    Neutrophils Absolute 8.90 (H) 1.80 - 7.30 E9/L    Lymphocytes Absolute 6.05 (H) 1.50 - 4.00 E9/L    Monocytes Absolute 1.68 (H) 0.10 - 0.95 E9/L    Eosinophils Absolute 0.00 (L) 0.05 - 0.50 E9/L    Basophils Absolute 0.15 0.00 - 0.20 E9/L    Polychromasia 1+     Poikilocytes 1+     Ovalocytes 1+    Comprehensive Metabolic Panel w/ Reflex to MG   Result Value Ref Range    Sodium 135 132 - 146 mmol/L    Potassium reflex Magnesium 4.6 3.5 - 5.0 mmol/L    Chloride 96 (L) 98 - 107 mmol/L    CO2 27 22 - 29 mmol/L    Anion Gap 12 7 - 16 mmol/L    Glucose 196 (H) 74 - 99 mg/dL    BUN 14 6 - 23 mg/dL    CREATININE 0.7 0.5 - 1.0 mg/dL    GFR Non-African American >60 >=60 mL/min/1.73    GFR African American >60     Calcium 9.4 8.6 - 10.2 mg/dL    Total Protein 7.5 6.4 - 8.3 g/dL    Albumin 4.1 3.5 - 5.2 g/dL    Total Bilirubin 0.9 0.0 - 1.2 mg/dL    Alkaline Phosphatase 87 35 - 104 U/L    ALT 12 0 - 32 U/L    AST 22 0 - 31 U/L   Lipase   Result Value Ref Range    Lipase 18 13 - 60 U/L   Lactate, Sepsis   Result Value Ref Range    Lactic Acid, Sepsis 1.5 0.5 - 1.9 mmol/L   Urinalysis with Microscopic   Result Value Ref Range    Color, UA Yellow Straw/Yellow    Clarity, UA Clear Clear    Glucose, Ur Negative Negative mg/dL    Bilirubin Urine Negative Negative    Ketones, Urine TRACE (A) Negative mg/dL    Specific Gravity, UA 1.015 1.005 - 1.030    Blood, Urine Negative Negative    pH, UA 7.0 5.0 - 9.0    Protein, UA Negative Negative mg/dL    Urobilinogen, Urine 2.0 (A) <2.0 E.U./dL    Nitrite, Urine Negative Negative    Leukocyte Esterase, Urine Negative Negative    WBC, UA 0-1 0 - 5 /HPF    RBC, UA 0-1 0 - 2 /HPF    Epithelial Cells, UA RARE /HPF    Bacteria, UA FEW (A) None Seen /HPF   Troponin   Result Value Ref Range    Troponin, High Sensitivity 11 (H) 0 - 9 ng/L   SPECIMEN REJECTION   Result Value Ref Range    Rejected Test TRP5     Reason for Rejection see below    Troponin   Result Value Ref Range    Troponin, High Sensitivity 11 (H) 0 - 9 ng/L   POCT Glucose   Result Value Ref Range    Glucose 171 mg/dL    QC OK?  yes    POCT Glucose   Result Value Ref Range    Meter Glucose 171 (H) 74 - 99 mg/dL   EKG 12 Lead   Result Value Ref Range    Ventricular Rate 78 BPM    Atrial Rate 78 BPM    P-R Interval 174 ms    QRS Duration 82 ms    Q-T Interval 378 ms    QTc Calculation (Bazett) 430 ms    P Axis 36 degrees    R Axis -11 degrees    T Axis 66 degrees   ,       RADIOLOGY:  Imaging interpreted by Radiologist unless otherwise specified  CT ABDOMEN PELVIS WO CONTRAST Additional Contrast? None   Final Result   1. 1.6 cm ill-defined nodular opacity in the right lower lobe may represent   an infectious/inflammatory process. Neoplasm cannot be excluded. Per the   recommendations of the [employer] of Radiology, imaging follow-up   immediate complete CT of the chest is recommended. 2. Moderate fecal retention in the colon may indicate constipation. No bowel   wall thickening or obstruction. 1      RECOMMENDATIONS:   Unavailable         CT Head WO Contrast   Final Result   1. There is no acute intracranial abnormality. Specifically, there is no   intracranial hemorrhage. 2. Atrophy and periventricular leukomalacia,         CT Cervical Spine WO Contrast   Final Result   No acute abnormality of the cervical spine. Advanced degenerative changes   throughout the cervical segments mid and lower portions with partial   ankylosing and listhesis findings as detailed above along with multilevel   spondylitic changes and disc osteophyte complexes with up to moderate   stenosis present on partial imaging. No acute findings. RECOMMENDATIONS:   MRI cervical spine may be considered if not contraindicated when clinically   appropriate to evaluate for disc pathology findings specifically if there is   radicular symptoms present. XR CHEST PORTABLE   Final Result   1. There is no gross infiltrate noted. Please note suboptimal inspiration   was obtained for the chest x-ray. If clinically warranted repeat PA and   lateral views can be obtained when the patient is clinically able. The cardiac monitor revealed NSR with a heart rate in the 80s as interpreted by me.  The cardiac monitor was ordered secondary to patients clinical status and to monitor the patient for dysrhythmia(s), tachycardia/bradycardia and/or changes in rhythm.     ------------------------- NURSING NOTES AND VITALS REVIEWED ---------------------------  The nursing notes within the ED encounter and vital signs as below have been reviewed by myself  BP (!) 168/72   Pulse 60   Temp 99 °F (37.2 °C) (Oral)   Resp 20   Ht 5' 2\" (1.575 m)   Wt 125 lb (56.7 kg)   SpO2 94%   BMI 22.86 kg/m²      The patients available past medical records and past encounters were reviewed. ------------------------------ ED COURSE/MEDICAL DECISION MAKING----------------------  Medications   glucose (GLUTOSE) 40 % oral gel 15 g (has no administration in time range)   glucagon (rDNA) injection 1 mg (has no administration in time range)   dextrose 5 % solution (has no administration in time range)   sodium chloride flush 0.9 % injection 5-40 mL (has no administration in time range)   sodium chloride flush 0.9 % injection 5-40 mL (has no administration in time range)   0.9 % sodium chloride infusion (has no administration in time range)   enoxaparin (LOVENOX) injection 40 mg (has no administration in time range)   polyethylene glycol (GLYCOLAX) packet 17 g (has no administration in time range)   acetaminophen (TYLENOL) tablet 650 mg (has no administration in time range)     Or   acetaminophen (TYLENOL) suppository 650 mg (has no administration in time range)   sennosides-docusate sodium (SENOKOT-S) 8.6-50 MG tablet 2 tablet (has no administration in time range)   dextrose bolus (hypoglycemia) 10% 125 mL (has no administration in time range)     Or   dextrose bolus (hypoglycemia) 10% 250 mL (has no administration in time range)   lactated ringers bolus (0 mLs IntraVENous Stopped 2/24/22 1438)   acetaminophen (TYLENOL) tablet 650 mg (650 mg Oral Given 2/24/22 1744)       IDr. Madina, am the primary provider of record    Medical Decision Making:   Altered mental status, increased urinary frequency, generalized fatigue, also cough. Suspect urinary tract infection, will screen for additional sources of infection with chest x-ray, Covid, flu.  Broad work-up for altered mental status, including CT head and C-spine given her recent complaints of neck pain, though suspect osteoarthritis by history and exam. No recent falls or

## 2022-02-24 NOTE — ED NOTES
Rn attempted to call N2N, nurse on 4th floor currently unable to take report at this time.       Elif Vo  02/24/22 [id number]

## 2022-02-24 NOTE — ED NOTES
Nurse to nurse report given to Elissa Pike RN pt to go to room 414-1 when she returns from CT Scan.       Paulino Ridgeway  02/24/22 [id number]

## 2022-02-24 NOTE — ED NOTES
Bed: 09  Expected date:   Expected time:   Means of arrival:   Comments:  213 Yony Gee RN  02/24/22 4415

## 2022-02-24 NOTE — CARE COORDINATION
SS Note: Covid test negative. Pt presented for AMS and fatigue. Per pt's dtr pt has extensive cervical osteoarthritis. SW met w/pt in ED 09 for transition of care planning. Spoke from door wearing mask/PPE and explained role. Pt's Dtr Yasmeen Arceo [phone] @ bedside. Pt was sleeping & is very groggy, never really spoke much during the entire time. Ayana Jacques notes she is POA for pt and will bring in the document so it can be scanned to chart. She provided all information about pt, again as pt is not talking. Ayana Jacques stated pt is normally not like this and is up and more active. She has regressed last few months both physically and cognitively. Pt is  & lives w/Kalpana & her  in a 1 story home. PTA, pt needs assistance w/IADL's/ADL's and has insurance. She is not vaccinated for Covid. Ayana Jacques noted she had tour scheduled for [employer] today as she is noting pt need LTC now. However, d/t pt's drastic decline today, she had EMS bring her to ED for eval. SW talked about process to get on Medicaid for LTC and pt may qualify (No guarantee) for skilled care to go to [facility]. Pending medical work-up, Ayana Jacques noted she would like to explore [facility] for skilled care now. [facility] 1st choice and Home second. She did not need [facility] list but SW did provided it to her in case need a 3rd choice. Ayana Jacques indicated she may bring pt home if she cannot get into [facility] or Home. PCP is [facility] is [facility] in [employer]. Pt has following DME: cane which is in room. Denies hx of HHC & Past hx of [facility]. No hx of 02. Advance Care Planning   Healthcare Decision Maker:    Primary Decision Maker: Tai Cory Sigala - [phone]    Click here to complete Healthcare Decision Makers including selection of the Healthcare Decision Maker Relationship (ie \"Primary\"). Today we documented Decision Maker(s) consistent with Legal Next of Kin hierarchy.      SW contacted [facility] and [facility] noted no beds @ this time. Pt would need to quarantine for 10 days if accepted. LYNN contacted [facility] noted they have beds and they have 14 day quarantine. LYNN apprised pt and her Dtr Eliud Cramer of above information. Eliud Cramer wanted to know about visitation. Lynn contacted [facility] and she noted they encourage no visitation. However, if it does occur, family needs to have PPE. LYNN apprised Eliud Cramer of this. If pt requires no medical admission, she wants to pursue [facility] for rehab- [facility] will be 1st choice. LYNN updated Dr. Jazmine Singh on above information. Also, completed formal referral to [address] [place] [address]. She will follow.    Electronically signed by BENNY Payne on 2/24/2022 at 6:38 PM

## 2022-02-25 PROBLEM — G93.41 ACUTE METABOLIC ENCEPHALOPATHY: Status: ACTIVE | Noted: 2022-02-25

## 2022-02-25 LAB
ALBUMIN SERPL-MCNC: 3.6 G/DL (ref 3.5–5.2)
ALP BLD-CCNC: 88 U/L (ref 35–104)
ALT SERPL-CCNC: 8 U/L (ref 0–32)
AMMONIA: 26 UMOL/L (ref 11–51)
ANION GAP SERPL CALCULATED.3IONS-SCNC: 10 MMOL/L (ref 7–16)
ANTISTREPTOLYSIN-O: 27 IU/ML (ref 0–200)
AST SERPL-CCNC: 15 U/L (ref 0–31)
ATYPICAL LYMPHOCYTE RELATIVE PERCENT: 0 % (ref 0–4)
BASOPHILS ABSOLUTE: 0 E9/L (ref 0–0.2)
BASOPHILS RELATIVE PERCENT: 0 % (ref 0–2)
BILIRUB SERPL-MCNC: 0.9 MG/DL (ref 0–1.2)
BUN BLDV-MCNC: 13 MG/DL (ref 6–23)
C-REACTIVE PROTEIN: 14 MG/DL (ref 0–0.4)
CALCIUM SERPL-MCNC: 9.1 MG/DL (ref 8.6–10.2)
CHLORIDE BLD-SCNC: 99 MMOL/L (ref 98–107)
CO2: 27 MMOL/L (ref 22–29)
CREAT SERPL-MCNC: 0.8 MG/DL (ref 0.5–1)
EOSINOPHILS ABSOLUTE: 0 E9/L (ref 0.05–0.5)
EOSINOPHILS RELATIVE PERCENT: 0 % (ref 0–6)
FOLATE: 11.3 NG/ML (ref 4.8–24.2)
GFR AFRICAN AMERICAN: >60
GFR NON-AFRICAN AMERICAN: >60 ML/MIN/1.73
GLUCOSE BLD-MCNC: 169 MG/DL (ref 74–99)
HCT VFR BLD CALC: 43.5 % (ref 34–48)
HEMOGLOBIN: 14.1 G/DL (ref 11.5–15.5)
L. PNEUMOPHILA SEROGP 1 UR AG: NORMAL
LYMPHOCYTES ABSOLUTE: 5.06 E9/L (ref 1.5–4)
LYMPHOCYTES RELATIVE PERCENT: 32 % (ref 20–42)
MAGNESIUM: 1.8 MG/DL (ref 1.6–2.6)
MCH RBC QN AUTO: 30.7 PG (ref 26–35)
MCHC RBC AUTO-ENTMCNC: 32.4 % (ref 32–34.5)
MCV RBC AUTO: 94.8 FL (ref 80–99.9)
MONOCYTES ABSOLUTE: 0.47 E9/L (ref 0.1–0.95)
MONOCYTES RELATIVE PERCENT: 3 % (ref 2–12)
NEUTROPHILS ABSOLUTE: 10.27 E9/L (ref 1.8–7.3)
NEUTROPHILS RELATIVE PERCENT: 65 % (ref 43–80)
OVALOCYTES: ABNORMAL
PATHOLOGIST REVIEW: NORMAL
PDW BLD-RTO: 13 FL (ref 11.5–15)
PHOSPHORUS: 2.3 MG/DL (ref 2.5–4.5)
PLATELET # BLD: 114 E9/L (ref 130–450)
PMV BLD AUTO: 11.2 FL (ref 7–12)
POIKILOCYTES: ABNORMAL
POTASSIUM SERPL-SCNC: 4.5 MMOL/L (ref 3.5–5)
PROCALCITONIN: 0.1 NG/ML (ref 0–0.08)
RBC # BLD: 4.59 E12/L (ref 3.5–5.5)
SEDIMENTATION RATE, ERYTHROCYTE: 22 MM/HR (ref 0–20)
SODIUM BLD-SCNC: 136 MMOL/L (ref 132–146)
STREP PNEUMONIAE ANTIGEN, URINE: NORMAL
T4 FREE: 1.43 NG/DL (ref 0.93–1.7)
TOTAL PROTEIN: 6.7 G/DL (ref 6.4–8.3)
TSH SERPL DL<=0.05 MIU/L-ACNC: 0.67 UIU/ML (ref 0.27–4.2)
VITAMIN B-12: 453 PG/ML (ref 211–946)
WBC # BLD: 15.8 E9/L (ref 4.5–11.5)

## 2022-02-25 PROCEDURE — G0378 HOSPITAL OBSERVATION PER HR: HCPCS

## 2022-02-25 PROCEDURE — 84100 ASSAY OF PHOSPHORUS: CPT

## 2022-02-25 PROCEDURE — 84439 ASSAY OF FREE THYROXINE: CPT

## 2022-02-25 PROCEDURE — 6370000000 HC RX 637 (ALT 250 FOR IP): Performed by: INTERNAL MEDICINE

## 2022-02-25 PROCEDURE — 6360000002 HC RX W HCPCS: Performed by: INTERNAL MEDICINE

## 2022-02-25 PROCEDURE — 97165 OT EVAL LOW COMPLEX 30 MIN: CPT

## 2022-02-25 PROCEDURE — 85651 RBC SED RATE NONAUTOMATED: CPT

## 2022-02-25 PROCEDURE — 84443 ASSAY THYROID STIM HORMONE: CPT

## 2022-02-25 PROCEDURE — 36415 COLL VENOUS BLD VENIPUNCTURE: CPT

## 2022-02-25 PROCEDURE — 94640 AIRWAY INHALATION TREATMENT: CPT

## 2022-02-25 PROCEDURE — 2580000003 HC RX 258: Performed by: INTERNAL MEDICINE

## 2022-02-25 PROCEDURE — 96372 THER/PROPH/DIAG INJ SC/IM: CPT

## 2022-02-25 PROCEDURE — 85025 COMPLETE CBC W/AUTO DIFF WBC: CPT

## 2022-02-25 PROCEDURE — 82140 ASSAY OF AMMONIA: CPT

## 2022-02-25 PROCEDURE — 84145 PROCALCITONIN (PCT): CPT

## 2022-02-25 PROCEDURE — 82746 ASSAY OF FOLIC ACID SERUM: CPT

## 2022-02-25 PROCEDURE — 83735 ASSAY OF MAGNESIUM: CPT

## 2022-02-25 PROCEDURE — 80053 COMPREHEN METABOLIC PANEL: CPT

## 2022-02-25 PROCEDURE — 96374 THER/PROPH/DIAG INJ IV PUSH: CPT

## 2022-02-25 PROCEDURE — 86140 C-REACTIVE PROTEIN: CPT

## 2022-02-25 PROCEDURE — 86060 ANTISTREPTOLYSIN O TITER: CPT

## 2022-02-25 PROCEDURE — 97161 PT EVAL LOW COMPLEX 20 MIN: CPT | Performed by: PHYSICAL THERAPIST

## 2022-02-25 PROCEDURE — 82607 VITAMIN B-12: CPT

## 2022-02-25 PROCEDURE — 87449 NOS EACH ORGANISM AG IA: CPT

## 2022-02-25 RX ORDER — IPRATROPIUM BROMIDE AND ALBUTEROL SULFATE 2.5; .5 MG/3ML; MG/3ML
1 SOLUTION RESPIRATORY (INHALATION) 4 TIMES DAILY
Status: DISCONTINUED | OUTPATIENT
Start: 2022-02-25 | End: 2022-02-26 | Stop reason: HOSPADM

## 2022-02-25 RX ORDER — RISPERIDONE 0.5 MG/1
0.25 TABLET, FILM COATED ORAL NIGHTLY
Status: DISCONTINUED | OUTPATIENT
Start: 2022-02-25 | End: 2022-02-26 | Stop reason: HOSPADM

## 2022-02-25 RX ORDER — AMLODIPINE BESYLATE 5 MG/1
5 TABLET ORAL DAILY
Status: DISCONTINUED | OUTPATIENT
Start: 2022-02-25 | End: 2022-02-26 | Stop reason: HOSPADM

## 2022-02-25 RX ADMIN — WATER 1000 MG: 1 INJECTION INTRAMUSCULAR; INTRAVENOUS; SUBCUTANEOUS at 10:07

## 2022-02-25 RX ADMIN — RISPERIDONE 0.25 MG: 0.5 TABLET ORAL at 21:57

## 2022-02-25 RX ADMIN — AMLODIPINE BESYLATE 5 MG: 5 TABLET ORAL at 09:57

## 2022-02-25 RX ADMIN — IPRATROPIUM BROMIDE AND ALBUTEROL SULFATE 1 AMPULE: 2.5; .5 SOLUTION RESPIRATORY (INHALATION) at 19:39

## 2022-02-25 RX ADMIN — IPRATROPIUM BROMIDE AND ALBUTEROL SULFATE 1 AMPULE: 2.5; .5 SOLUTION RESPIRATORY (INHALATION) at 15:28

## 2022-02-25 RX ADMIN — SENNOSIDES AND DOCUSATE SODIUM 2 TABLET: 50; 8.6 TABLET ORAL at 08:05

## 2022-02-25 RX ADMIN — SODIUM CHLORIDE, PRESERVATIVE FREE 10 ML: 5 INJECTION INTRAVENOUS at 21:58

## 2022-02-25 RX ADMIN — ACETAMINOPHEN 650 MG: 325 TABLET ORAL at 04:54

## 2022-02-25 RX ADMIN — SODIUM CHLORIDE, PRESERVATIVE FREE 10 ML: 5 INJECTION INTRAVENOUS at 08:35

## 2022-02-25 RX ADMIN — ACETAMINOPHEN 650 MG: 325 TABLET ORAL at 11:29

## 2022-02-25 RX ADMIN — ENOXAPARIN SODIUM 40 MG: 100 INJECTION SUBCUTANEOUS at 21:57

## 2022-02-25 RX ADMIN — SENNOSIDES AND DOCUSATE SODIUM 2 TABLET: 50; 8.6 TABLET ORAL at 21:57

## 2022-02-25 RX ADMIN — IPRATROPIUM BROMIDE AND ALBUTEROL SULFATE 1 AMPULE: 2.5; .5 SOLUTION RESPIRATORY (INHALATION) at 09:41

## 2022-02-25 ASSESSMENT — PAIN SCALES - GENERAL
PAINLEVEL_OUTOF10: 0
PAINLEVEL_OUTOF10: 0
PAINLEVEL_OUTOF10: 4

## 2022-02-25 ASSESSMENT — PAIN DESCRIPTION - PAIN TYPE: TYPE: ACUTE PAIN

## 2022-02-25 ASSESSMENT — PAIN DESCRIPTION - DESCRIPTORS: DESCRIPTORS: ACHING;DISCOMFORT

## 2022-02-25 ASSESSMENT — PAIN DESCRIPTION - LOCATION: LOCATION: GENERALIZED

## 2022-02-25 NOTE — PROGRESS NOTES
Internal Medicine Progress Note    [facility]    Mini Hagen. Michelle Flowers., F.A.SETH.O.I. Sunshine Villagomez D.O., LUPILLOOAMIE Valencia D.O. Flaquito Sethi, MSN, APRN, NP-C  Mitzy Banerjee. Saurabh Velasquez, MSN, APRN-CNP     Primary Care Physician: Zeke Grant MD   Admitting Physician:  Gael Koyanagi, DO  Admission date and time: 2/24/2022 10:54 AM    Room:  [address] [place], [place] [address]  Admitting diagnosis: Confusion [R41.0]  Altered mental status [R41.82]    Patient Name: Misti Lewis  MRN: [id number]    Date of Service: 2/25/2022     Subjective:  Clinton Salomon is a 78 y.o. female who was seen and examined today,2/25/2022, at the bedside. Patient seem to be resting comfortably at the present time. Granddaughter present at the bedside states that becoming more difficult to take care of her at home. Relates that appetite is sporadic at time. Progressive dementia is noted and the patient require more intervention than can be provided    Granddaughter present the bedside    Review of System: Limited due to patient mentation  Constitutional:   Denies fever or chills, weight loss or gain, fatigue or malaise. HEENT:   Denies ear pain, sore throat, sinus or eye problems. Cardiovascular:   Denies any chest pain, irregular heartbeats, or palpitations. Respiratory:   Denies shortness of breath, coughing, sputum production, hemoptysis, or wheezing. Gastrointestinal:   Denies nausea, vomiting, diarrhea, or constipation. Denies any abdominal pain. Genitourinary:    Denies any urgency, frequency, hematuria. Voiding  without difficulty. Extremities:   Denies lower extremity swelling, edema or cyanosis. Neurology:    Denies any headache or focal neurological deficits, Denies generalized weakness or memory difficulty. Psch:   Denies being anxious or depressed. Musculoskeletal:    Denies  myalgias, joint complaints or back pain. Integumentary:   Denies any rashes, ulcers, or excoriations.   Denies bruising. Hematologic/Lymphatic:  Denies bruising or bleeding. Physical Exam:  I/O this shift:  In: 240 [P.O.:240]  Out: -     Intake/Output Summary (Last 24 hours) at 2/25/2022 1546  Last data filed at 2/25/2022 1259  Gross per 24 hour   Intake 240 ml   Output --   Net 240 ml   No intake/output data recorded. Patient Vitals for the past 96 hrs (Last 3 readings):   Weight   02/24/22 1102 125 lb (56.7 kg)     Vital Signs:   Blood pressure 111/65, pulse 66, temperature 98.3 °F (36.8 °C), temperature source Oral, resp. rate 18, height 5' 2\" (1.575 m), weight 125 lb (56.7 kg), SpO2 93 %. General appearance:  Alert to person. Seem well-hydrated  Head:  Normocephalic. No masses, lesions or tenderness. Eyes:  PERRLA. EOMI. Sclera clear. Buccal mucosa moist.  ENT:  Ears normal. Mucosa normal.  Neck:    Supple. Trachea midline. No thyromegaly. No JVD. No bruits. Heart:    Rhythm regular. Rate controlled. 1/6 murmurs. Lungs:    Diminished at the base  Abdomen:   Soft. Non-tender. Non-distended. Bowel sounds positive. No organomegaly or masses. No pain on palpation. Extremities:    Peripheral pulses present. No peripheral edema. No ulcers. No cyanosis. No clubbing. Neurologic:    Alert to person only. No focal deficit. Cranial nerves grossly intact. No focal weakness. Psych:   Behavior is normal. Mood appears normal. Speech is not rapid and/or pressured. Musculoskeletal:   Spine ROM normal. Muscular strength intact. Gait not assessed. Integumentary:  No rashes  Skin normal color and texture.   Genitalia/Breast:  Deferred    Medication:  Scheduled Meds:   ipratropium-albuterol  1 ampule Inhalation 4x daily    amLODIPine  5 mg Oral Daily    cefTRIAXone (ROCEPHIN) IV  1,000 mg IntraVENous Q24H    sodium chloride flush  5-40 mL IntraVENous 2 times per day    enoxaparin  40 mg SubCUTAneous Daily    sennosides-docusate sodium  2 tablet Oral BID     Continuous Infusions:   dextrose      sodium chloride         Objective Data:  CBC with Differential:    Lab Results   Component Value Date    WBC 15.8 02/25/2022    RBC 4.59 02/25/2022    HGB 14.1 02/25/2022    HCT 43.5 02/25/2022     02/25/2022    MCV 94.8 02/25/2022    MCH 30.7 02/25/2022    MCHC 32.4 02/25/2022    RDW 13.0 02/25/2022    SEGSPCT 51 11/11/2011    LYMPHOPCT 32.0 02/25/2022    MONOPCT 3.0 02/25/2022    BASOPCT 0.0 02/25/2022    MONOSABS 0.47 02/25/2022    LYMPHSABS 5.06 02/25/2022    EOSABS 0.00 02/25/2022    BASOSABS 0.00 02/25/2022     CMP:    Lab Results   Component Value Date     02/25/2022    K 4.5 02/25/2022    K 4.6 02/24/2022    CL 99 02/25/2022    CO2 27 02/25/2022    BUN 13 02/25/2022    CREATININE 0.8 02/25/2022    GFRAA >60 02/25/2022    LABGLOM >60 02/25/2022    GLUCOSE 169 02/25/2022    GLUCOSE 122 11/11/2011    PROT 6.7 02/25/2022    LABALBU 3.6 02/25/2022    LABALBU 3.3 11/09/2011    CALCIUM 9.1 02/25/2022    BILITOT 0.9 02/25/2022    ALKPHOS 88 02/25/2022    AST 15 02/25/2022    ALT 8 02/25/2022       Wound Documentation:   Incision 11/08/11 Hip Right (Active)   Number of days: 7321       Incision 06/06/18 Neck (Active)   Number of days: 1360       Incision 08/15/18 Head Right (Active)   Number of days: 4605       Assessment:    · Altered mental status with a history of underlying dementia  · Failure to thrive with overall decline in functional status  · Constipation  · Possible right lower lobe pneumonia   · CLL  · Hypertension        Plan:       · Discussion was given with the granddaughter at the bedside. Social service will be talking with family regarding discharge planning  · Continue to treat chronic comorbidities  · Treatment underlying constipation  · Abnormality noted on chest x-ray we will treat as community-acquired pneumonia    More than 50% of my  time was spent at the bedside counseling/coordinating care with the patient and/or family with face to face contact.   This time was spent reviewing notes and laboratory data as well as instructing and counseling the patient. Time I spent with the family or surrogate(s) is included only if the patient was incapable of providing the necessary information or participating in medical decisions. I also discussed the differential diagnosis and all of the proposed management plans with the patient and individuals accompanying the patient. Dipika Negrete requires this high level of physician care and nursing on the Telemetry unit due the complexity of decision management and chance of rapid decline or death. Continued cardiac monitoring and higher level of nursing are required. I am readily available for any further decision-making and intervention.      Sofía Nascimento DO, ORLANDOA.C.O.I.  2/25/2022  3:46 PM

## 2022-02-25 NOTE — PROGRESS NOTES
[address] [facility]  [address]. [place]        Date:2022                                                  Patient Name: Leticia Thomason    MRN: [id number]    : 1940    Room: [address] [place] [address]      Evaluating OT: Daneil Flow OTR/L; [id number]     Referring Provider and Specific Provider Orders/Date:      22  OT eval and treat  Start:  22,   End:  22,   ONE TIME,   Standing Count:  1 Occurrences,   R         Khang Shanks,      Placement Recommendation: Subacute        Diagnosis:   1.  Altered mental status, unspecified altered mental status type         Surgery: none       Pertinent Medical History:       Past Medical History:   Diagnosis Date    Arthritis     Dementia ([facility] [address]     Hypertension     white coat syndrome    Menopausal sweats 2011    PONV (postoperative nausea and vomiting)     with anesthesia    S/P hip replacement 2011         Past Surgical History:   Procedure Laterality Date    HYSTERECTOMY      JOINT REPLACEMENT  2011    TJA right hip with cell saver    NERVE BLOCK Right 2014    right cervical paravertebral facet  #1    NERVE BLOCK      paravertebral facet    NERVE BLOCK Right 2014    cervical paravertebral facet #3    NERVE BLOCK Right 16    cervical facet #1    NERVE BLOCK Right 6/15/16    cervical paravert facet block #2    NERVE BLOCK Bilateral 2018    greater occipital nerve block with anesthesia #1    NERVE BLOCK Bilateral 2018    greater occipital #2    NERVE BLOCK Left 2018    greater occipital radiofrequency    NERVE BLOCK Right 08/15/2018    right occipital nerve radiofrequency    OTHER SURGICAL HISTORY  14    Radiofrequency right cervical C2-C6    OTHER SURGICAL HISTORY Right 2016    radiofrequency cervical    OTHER SURGICAL HISTORY Right 2017    cervical radiofrequency    ID INJECT RX OTHER PERIPH NERVE Left 7/11/2018    LEFT  GREATER OCCIPITAL RADIOFREQUENCY performed by Leia Abreu DO at [address] Right 8/15/2018    RIGHT  GREATER OCCIPITAL RADIOFREQUENCY performed by Leia Abreu DO at [address] [facility] NOSE/CLEFT LIP/TIP Bilateral 5/23/2018    BILATERAL GREATER OCCIPITAL NERVE BLOCK #1 UNDER ANESTHESIA performed by Leia Abreu DO at [address] [facility] NOSE/CLEFT LIP/TIP Bilateral 6/6/2018    BILATERAL GREATER OCCIPITAL NERVE BLOCK #2 UNDER ANESTHESIA performed by Leia Abreu DO at [address] Left 10/18/2016    anterior      Precautions:  Fall Risk, alarm, Dementia, arthritis       Assessment of current deficits:     [x] Functional mobility  [x]ADLs  [x] Strength               [x]Cognition    [x] Functional transfers   [x] IADLs         [x] Safety Awareness   [x]Endurance    [] Fine Coordination              [x] Balance      [] Vision/perception   []Sensation     []Gross Motor Coordination  [] ROM  [] Delirium                   [] Motor Control     OT PLAN OF CARE   OT POC based on physician orders, patient diagnosis and results of clinical assessment    Frequency/Duration 1-3 days/wk for 2 weeks PRN     Specific OT Treatment Interventions to include:   * Instruction/training on adapted ADL techniques and AE recommendations to increase functional independence within precautions       * Training on energy conservation strategies, correct breathing pattern and techniques to improve independence/tolerance for self-care routine  * Functional transfer/mobility training/DME recommendations for increased independence, safety, and fall prevention  * Patient/Family education to increase follow through with safety techniques and functional independence  * Recommendation of environmental modifications for increased safety with functional transfers/mobility and ADLs  * Splinting/positioning for increased function, prevention of contractures, and improve skin integrity  * Therapeutic exercise to improve motor endurance, ROM, and functional strength for ADLs/functional transfers  * Therapeutic activities to facilitate/challenge dynamic balance, stand tolerance for increased safety and independence with ADLs  * Positioning to improve skin integrity, interaction with environment and functional independence    Recommended Adaptive Equipment: TBD at rehab      Home Living: with daughter Henri Shell and her son in law; single family home, 1 story, 2 steps to enter with no rail, walk in shower. Equipment owned: wheeled walker, cane, grab bars    Prior Level of Function: needs assistance with ADLs and IADLs; ambulated with straight cane    Driving: no  Occupation: retired     Pain Level: no pain; Nursing notified. Cognition: A&O: 2/4; Follows 1 step directions. Granddaughter was present and supportive.    Memory: fair minus   Sequencing: fair    Problem solving: fair minus    Judgement/safety: fair minus     [facility]   AM-PAC Daily Activity Inpatient   How much help for putting on and taking off regular lower body clothing?: A Lot  How much help for Bathing?: A Lot  How much help for Toileting?: A Little  How much help for putting on and taking off regular upper body clothing?: A Little  How much help for taking care of personal grooming?: A Little  How much help for eating meals?: None  AM-[facility] Inpatient Daily Activity Raw Score: 17  AM-PAC Inpatient ADL T-Scale Score : 37.26  ADL Inpatient CMS 0-100% Score: 50.11  ADL Inpatient CMS G-Code Modifier : CK     Functional Assessment:    Initial Eval Status  Date: 2/25/22 Treatment Status  Date: STGs = LTGs  Time frame: 10-14 days   Feeding Independent   Independent    Grooming Minimal Assist to comb hair  Independent    UB Dressing Minimal Assist to adjust and tie gown   Independent    LB Dressing Moderate Assist to thread feet through incontinent garment then stood with assistance to bring garment up around hips and waist.  Independent    Bathing Moderate Assist  Modified [place]    Toileting Supervision   Independent    Bed Mobility  Supine to sit: Minimal Assist   Sit to supine: N/T as pt was up in chair, granddaughter present and will notify nursing if she needs to leave the room. Supine to sit: Independent   Sit to supine: Independent    Functional Transfers Minimal assist from EOB to wheeled walker. Moderate Assist for transfer to and from low commode with moderate verbal cues to use grab bar for safe commode transfer. Transfer training with verbal cues for hand placement throughout session to improve safety. Independent    Functional Mobility Minimal assist with wheeled walker to improve balance to and from bathroom and up to bedside chair, verbal cues for walker sequence and safety. Modified [place]    Balance Sitting:     Static: good     Dynamic: fair   Standing: fair  with wheeled walker     Activity Tolerance fair   good    Visual/  Perceptual Glasses: yes. Readers. Hand Dominance: right      AROM (PROM) Strength Additional Info:    RUE  WFL 4/5 good  and wfl FMC/dexterity noted during ADL tasks     LUE WFL 4/5 good  and wfl FMC/dexterity noted during ADL tasks       Hearing: WFL   Sensation:  No c/o numbness or tingling  Tone: WFL   Edema: no     Comments: Upon arrival the patient was supine. At end of session, patient was up in chair with call light and phone within reach, all lines and tubes intact. Granddaughter present. Overall patient demonstrated decreased independence and safety during completion of ADL/functional transfer/mobility tasks. Pt would benefit from continued skilled OT to increase safety and independence with completion of ADL/IADL tasks for functional independence and quality of life.     Treatment: OT treatment provided this date includes:    Instruction/training on safety and adapted techniques for completion of ADLs    Instruction/training on safe functional mobility/transfer techniques    Instruction/training on energy conservation/work simplification for completion of ADLs    Proper Positioning/Alignment    Rehab Potential: Good for established goals. Patient / Family Goal: return home       Patient and/or family were instructed on functional diagnosis, prognosis/goals and OT plan of care. Demonstrated good understanding. Eval Complexity: Low    Time In: 9:23am   Time Out: 9:46am    Total Treatment Time: 0      Min Units   OT Eval Low 97165  X  1    OT Eval Medium 10727      OT Eval High 11106      OT Re-Eval [id number]            ADL/Self Care 45962     Therapeutic Activities 11136       Therapeutic Ex 98791       Orthotic Management 93470       Manual [id number]     Neuro Re-Ed 54057       Non-Billable Time        Evaluation Time additionally includes thorough review of current medical information, gathering information on past medical history/social history and prior level of function, interpretation of standardized testing/informal observation of tasks, assessment of data and development of plan of care and goals.         Evaluating OT: Vern Zuluaga OTR/L; [id number]

## 2022-02-25 NOTE — PROGRESS NOTES
Pt refused test RN adriano lopez Practioner notified Said to send pt back to the floor [facility]

## 2022-02-25 NOTE — H&P
Department of Internal Medicine  History and Physical    PCP: Agapito Kraus MD  Admitting Physician: Dr. Von Easley  Consultants:   Date of Service: 2/24/2022    CHIEF COMPLAINT:  Altered mental status     HISTORY OF PRESENT ILLNESS:    Patient is 68-year-old man female presented to the ED due to declining functional status. Patient lives with daughter. Patient has multiple emergency at baseline. HPI obtained from chart review and family as patient is not able to answer questions. According to daughter patient has had a steady decline over the last few years which has worsened over the last month or so. This has worsened over the last few days as well. Patient states. Daughter states that patient has been having more difficulty walking. She has noted some falls without any significant injuries. Patient also has been more fatigue. She has been urinating and being incontinent. Patient's appetite has been maintained. She did develop a cough 3 days ago. She had 2 episodes of emesis earlier today. Family unable to care for patient. Otherwise patient does not give much in terms of HPI. Patient does not have any complaints on exam.  Family has noted patient has been more confused as well.     PAST MEDICAL Hx:  Past Medical History:   Diagnosis Date    Arthritis     Dementia [address] [address]     Hypertension     white coat syndrome    Menopausal sweats 11/8/2011    PONV (postoperative nausea and vomiting)     with anesthesia    S/P hip replacement 11/8/2011       PAST SURGICAL Hx:   Past Surgical History:   Procedure Laterality Date    HYSTERECTOMY      JOINT REPLACEMENT  nov 2011    TJA right hip with cell saver    NERVE BLOCK Right 4/2/2014    right cervical paravertebral facet  #1    NERVE BLOCK      paravertebral facet    NERVE BLOCK Right 04 16 2014    cervical paravertebral facet #3    NERVE BLOCK Right 5/18/16    cervical facet #1    NERVE BLOCK Right 6/15/16    cervical paravert facet block #2  NERVE BLOCK Bilateral 2018    greater occipital nerve block with anesthesia #1    NERVE BLOCK Bilateral 2018    greater occipital #2    NERVE BLOCK Left 2018    greater occipital radiofrequency    NERVE BLOCK Right 08/15/2018    right occipital nerve radiofrequency    OTHER SURGICAL HISTORY  14    Radiofrequency right cervical C2-C6    OTHER SURGICAL HISTORY Right 2016    radiofrequency cervical    OTHER SURGICAL HISTORY Right 2017    cervical radiofrequency    CA INJECT RX OTHER PERIPH NERVE Left 2018    LEFT  GREATER OCCIPITAL RADIOFREQUENCY performed by Edna Abel DO at [address] Right 8/15/2018    RIGHT  GREATER OCCIPITAL RADIOFREQUENCY performed by Edna Abel DO at [address] [facility] NOSE/CLEFT LIP/TIP Bilateral 2018    BILATERAL GREATER OCCIPITAL NERVE BLOCK #1 UNDER ANESTHESIA performed by Edna Abel DO at [address] [facility] NOSE/CLEFT LIP/TIP Bilateral 2018    BILATERAL GREATER OCCIPITAL NERVE BLOCK #2 UNDER ANESTHESIA performed by Edna Abel DO at [address] Left 10/18/2016    anterior       FAMILY Hx:  Family History   Problem Relation Age of Onset   Mary Rizo Parkinsonism Brother     Other Sister 10         at the age of 10 d/t appendicitis.  Heart Failure Sister     High Cholesterol Sister        HOME MEDICATIONS:  Prior to Admission medications    Medication Sig Start Date End Date Taking? Authorizing Provider   ibuprofen (ADVIL;MOTRIN) 600 MG tablet Take 600 mg by mouth every 8 hours as needed for Pain   Yes Historical Provider, MD   Cholecalciferol (VITAMIN D3) 125 MCG (5000 UT) TABS Take 5,000 Units by mouth daily   Yes Historical Provider, MD   ammonium lactate (LAC-HYDRIN) 12 % lotion Apply topically daily.  21  Yes Charo Chin., DPLUIS   lisinopril (PRINIVIL;ZESTRIL) 10 MG tablet Take 10 mg by mouth in the Last Year: Not on file       ROS: Positive in bold  General:   Denies chills, fatigue, fever, malaise, night sweats or weight loss    Psychological:   Denies anxiety, disorientation or hallucinations    ENT:    Denies epistaxis, headaches, vertigo or visual changes    Cardiovascular:   Denies any chest pain, irregular heartbeats, or palpitations. No paroxysmal nocturnal dyspnea. Respiratory:   Denies shortness of breath, coughing, sputum production, hemoptysis, or wheezing. No orthopnea. Gastrointestinal:   Denies nausea, vomiting, diarrhea, or constipation. Denies any abdominal pain. Denies change in bowel habits or stools. Genito-Urinary:    Denies any urgency, frequency, hematuria. Voiding without difficulty. Musculoskeletal:   Denies joint pain, joint stiffness, joint swelling or muscle pain    Neurology:    Denies any headache or focal neurological deficits. No weakness or paresthesia. Derm:    Denies any rashes, ulcers, or excoriations. Denies bruising. Extremities:   Denies any lower extremity swelling or edema. PHYSICAL EXAM: Abnormal findings noted  VITALS:  Vitals:    02/24/22 1835   BP:    Pulse:    Resp:    Temp: 99.1 °F (37.3 °C)   SpO2:          CONSTITUTIONAL:    Awake, alert, cooperative, no apparent distress, and appears stated age    EYES:    EOMI, sclera clear, conjunctiva normal    ENT:    Normocephalic, atraumatic,  External ears without lesions. NECK:    Supple, symmetrical, trachea midline, , no JVD    HEMATOLOGIC/LYMPHATICS:    No cervical lymphadenopathy and no supraclavicular lymphadenopathy    LUNGS:    Symmetric.  No increased work of breathing, good air exchange, clear to auscultation bilaterally, no wheezes, rhonchi, or rales,     CARDIOVASCULAR:    Normal apical impulse, regular rate and rhythm, normal S1 and S2, no S3 or S4, and no murmur noted    ABDOMEN:    soft, non-distended, non-tender    MUSCULOSKELETAL:    There is no redness, warmth, or swelling of the joints. NEUROLOGIC:    Awake, alert, oriented to name, place and time. SKIN:    No bruising or bleeding. No redness, warmth, or swelling    EXTREMITIES:    Peripheral pulses present. No edema, cyanosis, or swelling. LINES/CATHETERS     LABORATORY DATA:  CBC with Differential:    Lab Results   Component Value Date    WBC 16.8 02/24/2022    RBC 4.75 02/24/2022    HGB 14.8 02/24/2022    HCT 45.1 02/24/2022     02/24/2022    MCV 94.9 02/24/2022    MCH 31.2 02/24/2022    MCHC 32.8 02/24/2022    RDW 12.9 02/24/2022    SEGSPCT 51 11/11/2011    LYMPHOPCT 36.3 02/24/2022    MONOPCT 9.7 02/24/2022    BASOPCT 0.9 02/24/2022    MONOSABS 1.68 02/24/2022    LYMPHSABS 6.05 02/24/2022    EOSABS 0.00 02/24/2022    BASOSABS 0.15 02/24/2022     CMP:    Lab Results   Component Value Date     02/24/2022    K 4.6 02/24/2022    CL 96 02/24/2022    CO2 27 02/24/2022    BUN 14 02/24/2022    CREATININE 0.7 02/24/2022    GFRAA >60 02/24/2022    LABGLOM >60 02/24/2022    GLUCOSE 171 02/24/2022    GLUCOSE 122 11/11/2011    PROT 7.5 02/24/2022    LABALBU 4.1 02/24/2022    LABALBU 3.3 11/09/2011    CALCIUM 9.4 02/24/2022    BILITOT 0.9 02/24/2022    ALKPHOS 87 02/24/2022    AST 22 02/24/2022    ALT 12 02/24/2022       ASSESSMENT/PLAN:  1. Altered mental status  2. Failure to thrive  3. Constipation  4. Possible right lower lobe pneumonia   5. CLL  6. alzheimers   7. Hypertension    Patient presented due to increased confusion fatigue. Patient has decreased functional status. Patient has been experiencing ambulatory dysfunction. Blood work is unremarkable except for elevated white count. However patient has been started to have CLL in the past.  Hematology oncology will be consulted. Patient will have cultures ordered. CT abdomen and pelvis showed possible right lower lobe pneumonia. Family unable to care for patient at home.   He has been constipated placement floor some time now as patient has been having a steady decline over the last few years. PT OT will be consulted. Social work consulted.       Juan José Galdamez DO  7:10 PM  2/24/2022    Electronically signed by Juan José Galdamez DO on 2/24/22 at 7:10 PM EST

## 2022-02-25 NOTE — PROGRESS NOTES
Physical Therapy Initial Evaluation/Plan of Care    Room #:  9937/0878-12  Patient Name: Laura Steve  YOB: 1940  MRN: [id number]    Date of Service: 2/25/2022     Tentative placement recommendation: Subacute rehab  Equipment recommendation: To be determined      Evaluating Physical Therapist: Frances Lozoya PT #[id number]      Specific Provider Orders/Date/Referring Provider :  PT bruce and treat (Order #[id number]) on 2/24/22  Audrey Galloway DO    Admitting Diagnosis:   Confusion [R41.0]  Altered mental status [R41.82]     Increased confusion    Surgery: none      Patient Active Problem List   Diagnosis    Degenerative arthritis of hip R    Menopausal sweats    S/P hip replacement    Degenerativ osteoarthritis    Cervical facet syndrome, right greater than left    Protruded cervical disc    DDD (degenerative disc disease), cervical    Neural foraminal stenosis of cervical spine    Greater Occipital neuralgia    Facet arthropathy, cervical    Onychomycosis    PVD (peripheral vascular disease) ([facility] [address]    Venous insufficiency (chronic) (peripheral)    Difficulty walking    Confusion    Altered mental status        ASSESSMENT of Current Deficits Patient exhibits decreased strength, balance, endurance and confusion impairing functional mobility, transfers, gait  and gait distance confusion, minimal assist for walker negotiation and safety. Patient requires continued skilled physical therapy to address concerns listed above for increased safety and function at discharge.         PHYSICAL THERAPY  PLAN OF CARE       Physical therapy plan of care is established based on physician order,  patient diagnosis and clinical assessment    Current Treatment Recommendations:    -Standing Balance: Perform strengthening exercises in standing to promote motor control with or without upper extremity support   -Transfers: Provide instruction on proper hand and foot position for adequate transfer of weight onto lower extremities and use of gait device if needed and Cues for hand placement, technique and safety. Provide stabilization to prevent fall   -Gait: Gait training, Standing activities to improve: base of support, weight shift, weight bearing  and Pregait training to emphasize: Device approximation, Device control, Upright and Safety   -Endurance: Utilize Supervised activities to increase level of endurance to allow for safe functional mobility including transfers and gait     PT long term treatment goals are located in below grid    Patient and or family understand(s) diagnosis, prognosis, and plan of care. Frequency of treatments: Patient will be seen  daily.          Prior Level of Function: Patient ambulated with cane family encourages use of wheeled walker   Rehab Potential: good  - for baseline    Past medical history:   Past Medical History:   Diagnosis Date    Arthritis     Dementia ([facility] [address]     Hypertension     white coat syndrome    Menopausal sweats 11/8/2011    PONV (postoperative nausea and vomiting)     with anesthesia    S/P hip replacement 11/8/2011     Past Surgical History:   Procedure Laterality Date    HYSTERECTOMY      JOINT REPLACEMENT  nov 2011    TJA right hip with cell saver    NERVE BLOCK Right 4/2/2014    right cervical paravertebral facet  #1    NERVE BLOCK      paravertebral facet    NERVE BLOCK Right 04 16 2014    cervical paravertebral facet #3    NERVE BLOCK Right 5/18/16    cervical facet #1    NERVE BLOCK Right 6/15/16    cervical paravert facet block #2    NERVE BLOCK Bilateral 05/23/2018    greater occipital nerve block with anesthesia #1    NERVE BLOCK Bilateral 06/06/2018    greater occipital #2    NERVE BLOCK Left 07/11/2018    greater occipital radiofrequency    NERVE BLOCK Right 08/15/2018    right occipital nerve radiofrequency    OTHER SURGICAL HISTORY  05/29/14    Radiofrequency right cervical C2-C6    OTHER SURGICAL HISTORY Right 08/31/2016 radiofrequency cervical    OTHER SURGICAL HISTORY Right 11/01/2017    cervical radiofrequency    GA INJECT RX OTHER PERIPH NERVE Left 7/11/2018    LEFT  GREATER OCCIPITAL RADIOFREQUENCY performed by Swati Tobin DO at [address] Right 8/15/2018    RIGHT  GREATER OCCIPITAL RADIOFREQUENCY performed by Swati Tobin DO at [address] [facility] NOSE/CLEFT LIP/TIP Bilateral 5/23/2018    BILATERAL GREATER OCCIPITAL NERVE BLOCK #1 UNDER ANESTHESIA performed by Swati Tobin DO at [address] [facility] NOSE/CLEFT LIP/TIP Bilateral 6/6/2018    BILATERAL GREATER OCCIPITAL NERVE BLOCK #2 UNDER ANESTHESIA performed by Swati Tobin DO at [address] Left 10/18/2016    anterior       SUBJECTIVE:    Precautions: Up with assistance and Continuous Pulse Oximetry , falls, alarm and confusion ,    Social history: Patient lives with daughter and son in law Cindy Fierro in a two story home bedroom and bathroom 2nd floor half bath on first, full flight stairs with Rail  Has gate at top with 2 steps  to enter without [employer] in shower grab bars    Equipment owned: Taz Cooper,       [address]   How much difficulty turning over in bed?: A Little  How much difficulty sitting down on / standing up from a chair with arms?: A Little  How much difficulty moving from lying on back to sitting on side of bed?: A Little  How much help from another person moving to and from a bed to a chair?: A Little  How much help from another person needed to walk in hospital room?: A Little  How much help from another person for climbing 3-5 steps with a railing?: A Lot  AM-PAC Inpatient Mobility Raw Score : 17  AM-PAC Inpatient T-Scale Score : 42.13  Mobility Inpatient CMS 0-100% Score: 50.57  Mobility Inpatient CMS G-Code Modifier : CK    Nursing cleared patient for PT evaluation.  The admitting diagnosis and active problem list as listed above have been reviewed prior to the initiation of this evaluation. OBJECTIVE;   Initial Evaluation  Date: 2/25/2022 Treatment Date:     Short Term/ Long Term   Goals   Was pt agreeable to Eval/treatment? Yes  To be met in 3 days   Pain level   0/10        Bed Mobility    Rolling: Not assessed patient in chair      Rolling: Independent    Supine to sit:  Independent    Sit to supine: Independent    Scooting: Independent     Transfers Sit to stand: Minimal assist of 1 Cues for hand placement and safety   Sit to stand: Supervision      Ambulation    40 feet using  wheeled walker with Minimal assist of 1   for walker control and safety and cues for safety and obstacle negotiation   50 feet using  wheeled walker with Supervision     Stair negotiation: ascended and descended   Not assessed          ROM Within functional limits    Increase range of motion 10% of affected joints    Strength BUE:  refer to OT eval  RLE:  3+/5  LLE:  3+/5  Increase strength in affected mm groups by 1/3 grade   Balance Sitting EOB:  not assessed    Dynamic Standing:  fair wheeled walker   Sitting EOB:  good    Dynamic Standing: good       Patient is Alert & Oriented x person, place, time and situation and follows directions    Sensation:  Patient  denies numbness/tingling   Edema:  no   Endurance: fair       Vitals: room air    Blood Pressure at rest  Blood Pressure during session    Heart Rate at rest 90 Heart Rate during session     SPO2 at rest 96%  SPO2 during session 94% gait     Patient education  Patient educated on role of Physical Therapy, risks of immobility, safety and plan of care and  importance of mobility while in hospital      Patient response to education:   Pt verbalized understanding Pt demonstrated skill Pt requires further education in this area   Yes Partial Yes      Treatment:  Patient practiced and was instructed/facilitated in the following treatment: Patient in chair, granddaughter visiting. ambulated in room, returned up to chair. Therapeutic Exercises:  not performed      At end of session, patient in chair with granddaughter present call light and phone within reach,  all lines and tubes intact, nursing notified. Patient would benefit from continued skilled Physical Therapy to improve functional independence and quality of life. Patient's/ family goals   rehab    Time in  1  Time out  1023    Total Treatment Time  0 minutes    Evaluation time includes thorough review of current medical information, gathering information on past medical history/social history and prior level of function, completion of standardized testing/informal observation of tasks, assessment of data, and development of Plan of care and goals.      CPT codes:  Low Complexity PT evaluation (07497)  No treatment    Irma Nash, PT
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
## 2022-02-25 NOTE — CARE COORDINATION
Ss note:2/25/2022 2:13 PM Neg covid on 2-24-22. Pt has been accepted at [facility] for skilled rehab. BDMAXX TEST ORDERED FOR TOMORROW as this facility requires a NEG PCR within 48 hours. Pts dtr [facility] present in room and aware of acceptance, NO PRECERT but will need updated therapy notes for placement, HENS and signed FRANC. Pt is alert and oriented to self. SW will follow.  Greer Ewing, SADIEW

## 2022-02-25 NOTE — CARE COORDINATION
Ss note:2/25/2022 12:08 PM Neg covid on 2-24-22. WILL NEED A NEG PCR TEST WITHIN 48 HOURS OF DISCHARGE. Pt has been accepted at [facility] for skilled rehab. Will need Neg covid test, updated therapy notes, NO PRECERT, Hens and signed FRANC. Pts dtr Mahesh Robledo will need notified when discharge is written [phone].  BENNY Urias

## 2022-02-25 NOTE — DISCHARGE INSTR - COC
Continuity of Care Form    Patient Name: Sylvia Al   :  1940  MRN:  [id number]    Admit date:  2022  Discharge date:  22  C/ode Status Order: DNR-CCA   Advance Directives:      Admitting Physician:  Catia Wolfe DO  PCP: Sara Lozano MD    Discharging Nurse: [facility] Unit/Room#: 1955/2885-45  Discharging Unit Phone Number: ***    Emergency Contact:   Extended Emergency Contact Information  Primary Emergency Contact: Kayleen Carreon   [address] Phone: [phone]  Mobile Phone: [phone]  Relation: Child  Secondary Emergency Contact: [address] [place] [address] Phone: [phone]  Mobile Phone: [phone]  Relation: None    Past Surgical History:  Past Surgical History:   Procedure Laterality Date    HYSTERECTOMY      JOINT REPLACEMENT  2011    TJA right hip with cell saver    NERVE BLOCK Right 2014    right cervical paravertebral facet  #1    NERVE BLOCK      paravertebral facet    NERVE BLOCK Right 2014    cervical paravertebral facet #3    NERVE BLOCK Right 16    cervical facet #1    NERVE BLOCK Right 6/15/16    cervical paravert facet block #2    NERVE BLOCK Bilateral 2018    greater occipital nerve block with anesthesia #1    NERVE BLOCK Bilateral 2018    greater occipital #2    NERVE BLOCK Left 2018    greater occipital radiofrequency    NERVE BLOCK Right 08/15/2018    right occipital nerve radiofrequency    OTHER SURGICAL HISTORY  14    Radiofrequency right cervical C2-C6    OTHER SURGICAL HISTORY Right 2016    radiofrequency cervical    OTHER SURGICAL HISTORY Right 2017    cervical radiofrequency    UT INJECT RX OTHER PERIPH NERVE Left 2018    LEFT  GREATER OCCIPITAL RADIOFREQUENCY performed by Cecille Warren DO at [address] [place] [address] Right 8/15/2018    RIGHT  GREATER OCCIPITAL RADIOFREQUENCY performed by Cecille Warren DO at [address] NOSE/CLEFT LIP/TIP Bilateral 5/23/2018    BILATERAL GREATER OCCIPITAL NERVE BLOCK #1 UNDER ANESTHESIA performed by Josie De Anda DO at [address] NOSE/CLEFT LIP/TIP Bilateral 6/6/2018    BILATERAL GREATER OCCIPITAL NERVE BLOCK #2 UNDER ANESTHESIA performed by Josie De Anda DO at [address] Left 10/18/2016    anterior       Immunization History:   Immunization History   Administered Date(s) Administered    Influenza, Quadv, IM, PF (6 mo and older Fluzone, Flulaval, Fluarix, and 3 yrs and older Afluria) 10/19/2016       Active Problems:  Patient Active Problem List   Diagnosis Code    Degenerative arthritis of hip R M16.9    Menopausal sweats N95.1    S/P hip replacement Z96.649    Degenerativ osteoarthritis M19.90    Cervical facet syndrome, right greater than left M47.812    Protruded cervical disc M50.20    DDD (degenerative disc disease), cervical M50.30    Neural foraminal stenosis of cervical spine M48.02    Greater Occipital neuralgia M54.81    Facet arthropathy, cervical M47.812    Onychomycosis B35.1    PVD (peripheral vascular disease) (HCC) I73.9    Venous insufficiency (chronic) (peripheral) I87.2    Difficulty walking R26.2    Confusion R41.0    Altered mental status R41.82       Isolation/Infection:   Isolation            No Isolation          Patient Infection Status       Infection Onset Added Last Indicated Last Indicated By Review Planned Expiration Resolved Resolved By    None active    Resolved    COVID-19 (Rule Out) 02/24/22 02/24/22 02/24/22 COVID-19, Rapid (Ordered)   02/24/22 Rule-Out Test Resulted            Nurse Assessment:  Last Vital Signs: /65   Pulse 66   Temp 98.3 °F (36.8 °C) (Oral)   Resp 18   Ht 5' 2\" (1.575 m)   Wt 125 lb (56.7 kg)   SpO2 93%   BMI 22.86 kg/m²     Last documented pain score (0-10 scale): Pain Level: 4  Last Weight:   Wt Readings from Last 1 Encounters:   02/24/22 125 lb (56.7 kg)     Mental Status:  disoriented and alert    IV Access:  - None    Nursing Mobility/ADLs:  Walking   Assisted  Transfer  Assisted  Bathing  Assisted  Dressing  Assisted  Toileting  Assisted  Feeding  Assisted  Med Admin  Assisted  Med Delivery   whole    Wound Care Documentation and Therapy:  Incision 11/08/11 Hip Right (Active)   Number of days: 2533       Incision 06/06/18 Neck (Active)   Number of days: 1360       Incision 08/15/18 Head Right (Active)   Number of days: 1290        Elimination:  Continence: Bowel: No  Bladder: No  Urinary Catheter: None   Colostomy/Ileostomy/Ileal Conduit: No       Date of Last BM: ***  No intake or output data in the 24 hours ending 02/25/22 1212  No intake/output data recorded. Safety Concerns: At Risk for Falls    Impairments/Disabilities:      None    Nutrition Therapy:  Current Nutrition Therapy:   - Oral Diet:  General    Routes of Feeding: Oral  Liquids: Thin Liquids  Daily Fluid Restriction: no  Last Modified Barium Swallow with Video (Video Swallowing Test): not done    Treatments at the Time of Hospital Discharge:   Respiratory Treatments:     Oxygen Therapy:  is not on home oxygen therapy. Ventilator:    - No ventilator support    Rehab Therapies: Physical Therapy and Occupational Therapy  Weight Bearing Status/Restrictions: No weight bearing restirctions  Other Medical Equipment (for information only, NOT a DME order):     Other Treatments:    Patient's personal belongings (please select all that are sent with patient):  {[facility] DME Belongings:679524557}    RN SIGNATURE:  Electronically signed by Julio Holden RN on 2/26/22 at 10:04 AM EST    CASE MANAGEMENT/SOCIAL WORK SECTION    Inpatient Status Date: ***    Readmission Risk Assessment Score:  Readmission Risk              Risk of Unplanned Readmission:  0           Discharging to Facility/ Agency   Name:   [facility]   Address: [address] [place] [address] [address] [place] [id number]  Phone: [phone]  Fax: [phone]    Dialysis Facility (if applicable)   Name:  Address:  Dialysis Schedule:  Phone:  Fax:    / signature: Electronically signed by BENNY Mejia on 2/25/22 at 12:13 PM EST    PHYSICIAN SECTION    Prognosis: Good    Condition at Discharge: Stable    Rehab Potential (if transferring to Rehab): {Prognosis:4991585668}    Recommended Labs or Other Treatments After Discharge: ***    Physician Certification: I certify the above information and transfer of Tessy Harris  is necessary for the continuing treatment of the diagnosis listed and that she requires [place] for less 30 days.      Update Admission H&P: {[facility] DME Changes in BSLPM:181076799}    PHYSICIAN SIGNATURE:  Electronically signed by Kady Martinez DO on 2/26/22 at 8:58 AM EST

## 2022-02-25 NOTE — PROGRESS NOTES
Subjective:  Patient known to Dr. Sanjeev Zuniga, last seen in 2017 for CLL/SLL and did not wish to continue follow up the following year  She presented to the emergency department with confusion/Estrella and diagnosed with possible right lower lobe pneumonia   Patient seen at bedside her daughter present  She is eating her dinner and has no complaints. She is feeling better than she came in. The patient is awake and alert but uncertain patient has full insight to our discussion on my questions  Patient constipated on admission  Denies chest pain, angina, dyspnea, abdominal discomfort, nausea/vomiting    Objective:    /65   Pulse 66   Temp 98.3 °F (36.8 °C) (Oral)   Resp 18   Ht 5' 2\" (1.575 m)   Wt 125 lb (56.7 kg)   SpO2 93%   BMI 22.86 kg/m²     General: NAD, awake and alert  HEENT: normocephalic/atraumatic, mucosa dry without ulcerations,thrush or mucositis, EOMI, PERRLA, sclera anicteric, conjuntiva pink  NECK: supple, trachea midline  Heart:  RRR, murmurs appreciated, no gallops, or rubs.   Lungs: Poor respiratory effort breath sounds diminished to bases, no wheeze, rales or rhonchi  Abd: BS present, nontender, nondistended, no masses  Extrem:  No clubbing, cyanosis, or edema  Lymphatics: No palpable adenopathy in cervical and supraclavicular regions  Skin: Intact, no petechia or purpura    CBC with Differential:    Lab Results   Component Value Date    WBC 15.8 02/25/2022    RBC 4.59 02/25/2022    HGB 14.1 02/25/2022    HCT 43.5 02/25/2022     02/25/2022    MCV 94.8 02/25/2022    MCH 30.7 02/25/2022    MCHC 32.4 02/25/2022    RDW 13.0 02/25/2022    SEGSPCT 51 11/11/2011    LYMPHOPCT 32.0 02/25/2022    MONOPCT 3.0 02/25/2022    BASOPCT 0.0 02/25/2022    MONOSABS 0.47 02/25/2022    LYMPHSABS 5.06 02/25/2022    EOSABS 0.00 02/25/2022    BASOSABS 0.00 02/25/2022     CMP:    Lab Results   Component Value Date     02/25/2022    K 4.5 02/25/2022    K 4.6 02/24/2022    CL 99 02/25/2022    CO2 27 02/25/2022    BUN 13 02/25/2022    CREATININE 0.8 02/25/2022    GFRAA >60 02/25/2022    LABGLOM >60 02/25/2022    GLUCOSE 169 02/25/2022    GLUCOSE 122 11/11/2011    PROT 6.7 02/25/2022    LABALBU 3.6 02/25/2022    LABALBU 3.3 11/09/2011    CALCIUM 9.1 02/25/2022    BILITOT 0.9 02/25/2022    ALKPHOS 88 02/25/2022    AST 15 02/25/2022    ALT 8 02/25/2022          Current Facility-Administered Medications:     perflutren lipid microspheres (DEFINITY) injection 1.65 mg, 1.5 mL, IntraVENous, ONCE PRN, Karla Polanco DO    ipratropium-albuterol (DUONEB) nebulizer solution 1 ampule, 1 ampule, Inhalation, 4x daily, Montrell Polanco DO, 1 ampule at 02/25/22 1528    amLODIPine (NORVASC) tablet 5 mg, 5 mg, Oral, Daily, Montrell Polanco DO, 5 mg at 02/25/22 0957    cefTRIAXone (ROCEPHIN) 1,000 mg in sterile water 10 mL IV syringe, 1,000 mg, IntraVENous, Q24H, Montrell Polanco DO, 1,000 mg at 02/25/22 1007    glucose (GLUTOSE) 40 % oral gel 15 g, 15 g, Oral, PRN, Joaquina Fontan, DO    glucagon (rDNA) injection 1 mg, 1 mg, IntraMUSCular, PRN, Ismail U Phill, DO    dextrose 5 % solution, 100 mL/hr, IntraVENous, PRN, Ismail U Phill, DO    sodium chloride flush 0.9 % injection 5-40 mL, 5-40 mL, IntraVENous, 2 times per day, Joaquina Kadentan, DO, 10 mL at 02/25/22 0835    sodium chloride flush 0.9 % injection 5-40 mL, 5-40 mL, IntraVENous, PRN, Joaquina Kadentan, DO    0.9 % sodium chloride infusion, 25 mL, IntraVENous, PRN, Ismail U Phill, DO    enoxaparin (LOVENOX) injection 40 mg, 40 mg, SubCUTAneous, Daily, Ismail U Phill, DO    polyethylene glycol (GLYCOLAX) packet 17 g, 17 g, Oral, Daily PRN, Joaquina Willis DO    acetaminophen (TYLENOL) tablet 650 mg, 650 mg, Oral, Q6H PRN, 650 mg at 02/25/22 1129 **OR** acetaminophen (TYLENOL) suppository 650 mg, 650 mg, Rectal, Q6H PRN, Joaquina Willis DO    sennosides-docusate sodium (SENOKOT-S) 8.6-50 MG tablet 2 tablet, 2 tablet, Oral, BID, Joaquina Willis DO, 2 tablet at 02/25/22 0805    dextrose bolus (hypoglycemia) 10% 125 mL, 125 mL, IntraVENous, PRN **OR** dextrose bolus (hypoglycemia) 10% 250 mL, 250 mL, IntraVENous, PRN, Lesley Zhang, DO    CT ABDOMEN PELVIS WO CONTRAST Additional Contrast? None   Final Result   1. 1.6 cm ill-defined nodular opacity in the right lower lobe may represent   an infectious/inflammatory process. Neoplasm cannot be excluded. Per the   recommendations of the [employer] of Radiology, imaging follow-up   immediate complete CT of the chest is recommended. 2. Moderate fecal retention in the colon may indicate constipation. No bowel   wall thickening or obstruction. 1      RECOMMENDATIONS:   Unavailable         CT Head WO Contrast   Final Result   1. There is no acute intracranial abnormality. Specifically, there is no   intracranial hemorrhage. 2. Atrophy and periventricular leukomalacia,         CT Cervical Spine WO Contrast   Final Result   No acute abnormality of the cervical spine. Advanced degenerative changes   throughout the cervical segments mid and lower portions with partial   ankylosing and listhesis findings as detailed above along with multilevel   spondylitic changes and disc osteophyte complexes with up to moderate   stenosis present on partial imaging. No acute findings. RECOMMENDATIONS:   MRI cervical spine may be considered if not contraindicated when clinically   appropriate to evaluate for disc pathology findings specifically if there is   radicular symptoms present. XR CHEST PORTABLE   Final Result   1. There is no gross infiltrate noted. Please note suboptimal inspiration   was obtained for the chest x-ray. If clinically warranted repeat PA and   lateral views can be obtained when the patient is clinically able.            77-year-old female with history of severe osteoarthritis who underwent left femoral head resection replacement in the resected bone pathology revealed multiple mature lymphoid aggregates, consistent with SLL/CLL  Del 13q and Monoclonal gammopathy, M spike 0.4 g/dL    Recommendations: There are no needs to initiate treatment for Hannah's SLL/CLL at this time and this is very unlikely to be her life limiting illness. She has mild thrombocytopenia, however this is present and fairly stable since 2011. She could be treated supportively with transfusions if needed. Patient does not need to follow-up outpatient and her daughter indicated they did not feel it necessary for follow-up and they would not want any additional interventions or work-up at this point for her. Thank you for allowing us to participate in the care of Shahriar King. Kalyani Doty PA-C  [phone]    Electronically signed by EMERY Tapia on 2/25/2022 at 4:53 PM    Note: This report was completed using [employer] voiced recognition software. Every effort has been made to ensure accuracy; however, inadvertent computerized transcription errors may be present. Patient was evaluated, chart reviewed and case discussed with PA. Answered her daughter's questions. I agree with above assessment and recommendations. Patient has very early stage CLL and I doubt it will affect her prognosis which is related to other significant comorbidities. No intervention is required from hematology standpoint.   We will sign off

## 2022-02-26 VITALS
WEIGHT: 125 LBS | TEMPERATURE: 98.8 F | OXYGEN SATURATION: 96 % | RESPIRATION RATE: 18 BRPM | HEIGHT: 62 IN | BODY MASS INDEX: 23 KG/M2 | DIASTOLIC BLOOD PRESSURE: 80 MMHG | HEART RATE: 90 BPM | SYSTOLIC BLOOD PRESSURE: 145 MMHG

## 2022-02-26 LAB
ALBUMIN SERPL-MCNC: 3.3 G/DL (ref 3.5–5.2)
ALP BLD-CCNC: 134 U/L (ref 35–104)
ALT SERPL-CCNC: 10 U/L (ref 0–32)
ANION GAP SERPL CALCULATED.3IONS-SCNC: 11 MMOL/L (ref 7–16)
AST SERPL-CCNC: 14 U/L (ref 0–31)
BASOPHILS ABSOLUTE: 0 E9/L (ref 0–0.2)
BASOPHILS RELATIVE PERCENT: 0.3 % (ref 0–2)
BILIRUB SERPL-MCNC: 0.6 MG/DL (ref 0–1.2)
BUN BLDV-MCNC: 13 MG/DL (ref 6–23)
CALCIUM SERPL-MCNC: 8.5 MG/DL (ref 8.6–10.2)
CHLORIDE BLD-SCNC: 98 MMOL/L (ref 98–107)
CO2: 26 MMOL/L (ref 22–29)
CREAT SERPL-MCNC: 0.7 MG/DL (ref 0.5–1)
EOSINOPHILS ABSOLUTE: 0 E9/L (ref 0.05–0.5)
EOSINOPHILS RELATIVE PERCENT: 0.1 % (ref 0–6)
GFR AFRICAN AMERICAN: >60
GFR NON-AFRICAN AMERICAN: >60 ML/MIN/1.73
GLUCOSE BLD-MCNC: 176 MG/DL (ref 74–99)
HCT VFR BLD CALC: 40.3 % (ref 34–48)
HEMOGLOBIN: 13.3 G/DL (ref 11.5–15.5)
LYMPHOCYTES ABSOLUTE: 5.66 E9/L (ref 1.5–4)
LYMPHOCYTES RELATIVE PERCENT: 38.9 % (ref 20–42)
MCH RBC QN AUTO: 30.9 PG (ref 26–35)
MCHC RBC AUTO-ENTMCNC: 33 % (ref 32–34.5)
MCV RBC AUTO: 93.5 FL (ref 80–99.9)
MONOCYTES ABSOLUTE: 1.16 E9/L (ref 0.1–0.95)
MONOCYTES RELATIVE PERCENT: 8 % (ref 2–12)
NEUTROPHILS ABSOLUTE: 7.69 E9/L (ref 1.8–7.3)
NEUTROPHILS RELATIVE PERCENT: 53.1 % (ref 43–80)
OVALOCYTES: ABNORMAL
PDW BLD-RTO: 12.8 FL (ref 11.5–15)
PLATELET # BLD: 117 E9/L (ref 130–450)
PMV BLD AUTO: 11.9 FL (ref 7–12)
POIKILOCYTES: ABNORMAL
POTASSIUM SERPL-SCNC: 3.8 MMOL/L (ref 3.5–5)
RBC # BLD: 4.31 E12/L (ref 3.5–5.5)
SODIUM BLD-SCNC: 135 MMOL/L (ref 132–146)
TOTAL PROTEIN: 6.2 G/DL (ref 6.4–8.3)
WBC # BLD: 14.5 E9/L (ref 4.5–11.5)

## 2022-02-26 PROCEDURE — 94640 AIRWAY INHALATION TREATMENT: CPT

## 2022-02-26 PROCEDURE — 6370000000 HC RX 637 (ALT 250 FOR IP): Performed by: INTERNAL MEDICINE

## 2022-02-26 PROCEDURE — G0378 HOSPITAL OBSERVATION PER HR: HCPCS

## 2022-02-26 PROCEDURE — 96376 TX/PRO/DX INJ SAME DRUG ADON: CPT

## 2022-02-26 PROCEDURE — 80053 COMPREHEN METABOLIC PANEL: CPT

## 2022-02-26 PROCEDURE — 36415 COLL VENOUS BLD VENIPUNCTURE: CPT

## 2022-02-26 PROCEDURE — 2580000003 HC RX 258: Performed by: INTERNAL MEDICINE

## 2022-02-26 PROCEDURE — 85025 COMPLETE CBC W/AUTO DIFF WBC: CPT

## 2022-02-26 PROCEDURE — 6360000002 HC RX W HCPCS: Performed by: INTERNAL MEDICINE

## 2022-02-26 RX ORDER — DOXYCYCLINE HYCLATE 100 MG
100 TABLET ORAL 2 TIMES DAILY
Qty: 10 TABLET | Refills: 0 | Status: SHIPPED | OUTPATIENT
Start: 2022-02-26 | End: 2022-03-03

## 2022-02-26 RX ORDER — AMLODIPINE BESYLATE 5 MG/1
5 TABLET ORAL DAILY
Qty: 30 TABLET | Refills: 3 | Status: SHIPPED | OUTPATIENT
Start: 2022-02-26

## 2022-02-26 RX ADMIN — SENNOSIDES AND DOCUSATE SODIUM 2 TABLET: 50; 8.6 TABLET ORAL at 09:03

## 2022-02-26 RX ADMIN — IPRATROPIUM BROMIDE AND ALBUTEROL SULFATE 1 AMPULE: 2.5; .5 SOLUTION RESPIRATORY (INHALATION) at 05:56

## 2022-02-26 RX ADMIN — SODIUM CHLORIDE, PRESERVATIVE FREE 10 ML: 5 INJECTION INTRAVENOUS at 09:10

## 2022-02-26 RX ADMIN — IPRATROPIUM BROMIDE AND ALBUTEROL SULFATE 1 AMPULE: 2.5; .5 SOLUTION RESPIRATORY (INHALATION) at 14:59

## 2022-02-26 RX ADMIN — WATER 1000 MG: 1 INJECTION INTRAMUSCULAR; INTRAVENOUS; SUBCUTANEOUS at 09:03

## 2022-02-26 RX ADMIN — IPRATROPIUM BROMIDE AND ALBUTEROL SULFATE 1 AMPULE: 2.5; .5 SOLUTION RESPIRATORY (INHALATION) at 09:25

## 2022-02-26 RX ADMIN — AMLODIPINE BESYLATE 5 MG: 5 TABLET ORAL at 09:03

## 2022-02-26 NOTE — PLAN OF CARE
Problem: Falls - Risk of:  Goal: Will remain free from falls  Description: Will remain free from falls  2/26/2022 0312 by Harvinder Montero RN  Outcome: Met This Shift     Problem: Skin Integrity:  Goal: Absence of new skin breakdown  Description: Absence of new skin breakdown  Outcome: Met This Shift

## 2022-02-26 NOTE — DISCHARGE SUMMARY
Internal Medicine Progress Note     [facility]     Cathy Simeon. Raquel Sheffield, HANS Cook D.O., JIGNESH Parker, MSN, APRN, NP-C  Ramez Henriquez. Ingrid Agee, MSN, APRN-CNP       Internal Medicine  Discharge Summary    NAME: Caroline Edwards  :  1940  MRN:  [id number]  Hima Beauchamp MD  ADMITTED: 2022      DISCHARGED: 22    ADMITTING PHYSICIAN: Cathy Polanco DO    CONSULTANT(S):   IP CONSULT TO SOCIAL WORK  IP CONSULT TO HEM/ONC     ADMITTING DIAGNOSIS:   Confusion [R41.0]  Altered mental status [R41.82]     DISCHARGE DIAGNOSES:   1. Community-acquired pneumonia  2. Altered mental status with a history of underlying dementia  3. Failure to thrive with overall decline in functional status  4. CLL  5. Essential hypertension    BRIEF HISTORY OF PRESENT ILLNESS:   Patient is 68-year-old man female presented to the ED due to declining functional status. Patient lives with daughter. Patient has multiple emergency at baseline. HPI obtained from chart review and family as patient is not able to answer questions. According to daughter patient has had a steady decline over the last few years which has worsened over the last month or so. This has worsened over the last few days as well. Patient states. Daughter states that patient has been having more difficulty walking. She has noted some falls without any significant injuries. Patient also has been more fatigue. She has been urinating and being incontinent. Patient's appetite has been maintained. She did develop a cough 3 days ago. She had 2 episodes of emesis earlier today. Family unable to care for patient. Otherwise patient does not give much in terms of HPI. Patient does not have any complaints on exam.  Family has noted patient has been more confused as well.     LABS[de-identified]  Lab Results   Component Value Date    WBC 14.5 (H) 2022    HGB 13.3 02/26/2022    HCT 40.3 02/26/2022     (L) 02/26/2022     02/26/2022    K 3.8 02/26/2022    CL 98 02/26/2022    CREATININE 0.7 02/26/2022    BUN 13 02/26/2022    CO2 26 02/26/2022    GLUCOSE 176 (H) 02/26/2022    ALT 10 02/26/2022    AST 14 02/26/2022    INR 1.0 10/11/2016     Lab Results   Component Value Date    INR 1.0 10/11/2016    INR 2.1 11/10/2011    INR 1.0 11/01/2011    PROTIME 10.9 10/11/2016    PROTIME 18.6 (H) 11/10/2011    PROTIME 12.3 11/01/2011      Lab Results   Component Value Date    TSH 0.671 02/25/2022     Lab Results   Component Value Date    TRIG 69 10/19/2016    TRIG 59 11/09/2011     Lab Results   Component Value Date    HDL 83 10/19/2016    HDL 73.0 11/09/2011     Lab Results   Component Value Date    LDLCALC 68 10/19/2016    [address] 57 11/09/2011     No results found for: LABA1C    IMAGING:  CT ABDOMEN PELVIS WO CONTRAST Additional Contrast? None    Result Date: 2/24/2022  EXAMINATION: CT OF THE ABDOMEN AND PELVIS WITHOUT CONTRAST 2/24/2022 6:00 pm TECHNIQUE: CT of the abdomen and pelvis was performed without the administration of intravenous contrast. Multiplanar reformatted images are provided for review. Dose modulation, iterative reconstruction, and/or weight based adjustment of the mA/kV was utilized to reduce the radiation dose to as low as reasonably achievable. COMPARISON: None. HISTORY: ORDERING SYSTEM PROVIDED HISTORY: wbc elevation TECHNOLOGIST PROVIDED HISTORY: Reason for exam:->wbc elevation Additional Contrast?->None FINDINGS: Lower Chest: 1.6 cm ill-defined nodular opacity in the right lower lobe may be infectious/inflammatory. A neoplastic etiology cannot be excluded. The left lung base is clear. The heart is mildly enlarged. Organs: Liver: Unremarkable. Gallbladder: Unremarkable. Pancreas: Unremarkable. Spleen:  Unremarkable. Adrenals: Unremarkable. Kidneys: Unremarkable. GI/Bowel: Moderate fecal retention is seen in the colon.   No bowel wall thickening or obstruction. Normal appendix. Pelvis: The visualized urinary bladder is unremarkable. The bladder base is obscured by beam hardening artifact from bilateral hip arthroplasties. The uterus is surgically absent. Peritoneum/Retroperitoneum: Minimal atherosclerosis is seen in the aorta. No aneurysm. No enlarged lymph node is seen in the abdomen or the pelvis No free air or free fluid is seen. Bones/Soft Tissues: The visualized bones are intact without fracture or focal lesion. Prominent loss of disc heights in the visualized thoracolumbar spine. Bilateral total hip arthroplasties are noted. 1. 1.6 cm ill-defined nodular opacity in the right lower lobe may represent an infectious/inflammatory process. Neoplasm cannot be excluded. Per the recommendations of the [employer] of Radiology, imaging follow-up immediate complete CT of the chest is recommended. 2. Moderate fecal retention in the colon may indicate constipation. No bowel wall thickening or obstruction. 1 RECOMMENDATIONS: Unavailable     CT Head WO Contrast    Result Date: 2/24/2022  EXAMINATION: CT OF THE HEAD WITHOUT CONTRAST  2/24/2022 1:05 pm TECHNIQUE: CT of the head was performed without the administration of intravenous contrast. Dose modulation, iterative reconstruction, and/or weight based adjustment of the mA/kV was utilized to reduce the radiation dose to as low as reasonably achievable. COMPARISON: None. HISTORY: ORDERING SYSTEM PROVIDED HISTORY: ams TECHNOLOGIST PROVIDED HISTORY: Reason for exam:->ams Has a \"code stroke\" or \"stroke alert\" been called? ->No Decision Support Exception - unselect if not a suspected or confirmed emergency medical condition->Emergency Medical Condition (MA) FINDINGS: BRAIN/VENTRICLES: There is no acute intracranial hemorrhage, mass effect or midline shift. No abnormal extra-axial fluid collection. The gray-white differentiation is maintained without evidence of an acute infarct.   There is no evidence of hydrocephalus. The ventricles, cisterns and sulci are prominent consistent with atrophy. There is decreased attenuation within the periventricular white matter consistent with periventricular leukomalacia. ORBITS: The visualized portion of the orbits demonstrate no acute abnormality. SINUSES: The visualized paranasal sinuses and mastoid air cells demonstrate no acute abnormality. SOFT TISSUES/SKULL:  No acute abnormality of the visualized skull or soft tissues. 1. There is no acute intracranial abnormality. Specifically, there is no intracranial hemorrhage. 2. Atrophy and periventricular leukomalacia,     CT Cervical Spine WO Contrast    Result Date: 2/24/2022  EXAMINATION: CT OF THE CERVICAL SPINE WITHOUT CONTRAST 2/24/2022 1:05 pm TECHNIQUE: CT of the cervical spine was performed without the administration of intravenous contrast. Multiplanar reformatted images are provided for review. Dose modulation, iterative reconstruction, and/or weight based adjustment of the mA/kV was utilized to reduce the radiation dose to as low as reasonably achievable. COMPARISON: None. HISTORY: ORDERING SYSTEM PROVIDED HISTORY: neck arthritis TECHNOLOGIST PROVIDED HISTORY: Reason for exam:->neck arthritis Decision Support Exception - unselect if not a suspected or confirmed emergency medical condition->Emergency Medical Condition (MA) FINDINGS: BONES/ALIGNMENT: There is no acute fracture or traumatic malalignment. Degenerative changes with grade 1 anterolisthesis of C3 on C4 and minimal retrolisthesis of C6 on C7. DEGENERATIVE CHANGES: Moderate to severe degenerative changes throughout the mid and lower segments of greatest predominance including partial ankylosing of the C4-C5 levels. SOFT TISSUES: There is no prevertebral soft tissue swelling. Multiple levels of disc osteophyte complexes on partial imaging soft tissue windows throughout the mid and lower portions with up to moderate stenosis present.      No acute abnormality of the cervical spine. Advanced degenerative changes throughout the cervical segments mid and lower portions with partial ankylosing and listhesis findings as detailed above along with multilevel spondylitic changes and disc osteophyte complexes with up to moderate stenosis present on partial imaging. No acute findings. RECOMMENDATIONS: MRI cervical spine may be considered if not contraindicated when clinically appropriate to evaluate for disc pathology findings specifically if there is radicular symptoms present. XR CHEST PORTABLE    Result Date: 2/24/2022  EXAMINATION: ONE XRAY VIEW OF THE CHEST 2/24/2022 11:29 am COMPARISON: None. HISTORY: ORDERING SYSTEM PROVIDED HISTORY: generalized fatigue TECHNOLOGIST PROVIDED HISTORY: Reason for exam:->generalized fatigue FINDINGS: Suboptimal inspiration was obtained for the chest x-ray The cardiac silhouette is within normal limits. No gross focal infiltrate is noted. There is no pleural effusion or pleural thickening. 1. There is no gross infiltrate noted. Please note suboptimal inspiration was obtained for the chest x-ray. If clinically warranted repeat PA and lateral views can be obtained when the patient is clinically able. HOSPITAL COURSE:   Clinton Salomon did well throughout the hospitalization. Her altered mental status improved to some degree but appears compatible with her baseline dementia. Community-acquired pneumonia was identified and she will complete a course of oral antibiotics. She received IV fluid resuscitation and worked with the therapy teams. It was determined she would benefit from temporary skilled nursing facility placement and this was arranged accordingly. She was evaluated by the oncology team in the setting of early stage CLL and she will continue to follow-up with the oncology team as an outpatient.  Based on the appearance of the CT scan suggesting pneumonia versus a discrete malignant process, we are recommending repeat CT scan pending aggressiveness of care moving forward. Family has been updated accordingly. Otherwise, she is acceptable for transfer to the nursing home facility today     BRIEF PHYSICAL EXAMINATION AND LABORATORIES ON DAY OF DISCHARGE:  VITALS:  BP (!) 145/80   Pulse 90   Temp 98.8 °F (37.1 °C) (Axillary)   Resp 18   Ht 5' 2\" (1.575 m)   Wt 125 lb (56.7 kg)   SpO2 93%   BMI 22.86 kg/m²     HEENT:  PERRLA. EOMI. Sclera clear. Buccal mucosa moist.    Neck:  Supple. Trachea midline. No thyromegaly. No JVD. No bruits. Heart:  Rhythm regular, rate controlled. Systolic murmur. Lungs:  Symmetrical. Clear to auscultation bilaterally. No wheezes. No rhonchi. No rales. Abdomen: Soft. Non-tender. Non-distended. Bowel sounds positive. No organomegaly or masses. No pain on palpation    Extremities:  Peripheral pulses present. No peripheral edema. No ulcers. Neurologic: Obvious signs of dementia. She is oriented to person and place. No focal deficits are appreciated    Skin:  No petechia. No hemorrhage. No wounds. DISPOSITION:  The patient's condition is good. At this time the patient is without objective evidence of an acute process requiring continuing hospitalization or inpatient management. They are stable for discharge with outpatient follow-up. I have spoken with the patient and discussed the results of the current hospitalization, in addition to providing specific details for the plan of care and counseling regarding the diagnosis and prognosis. The plan has been discussed in detail and they are aware of the specific conditions for emergent return, as well as the importance of follow-up.   Their questions are answered at this time and they are agreeable with the plan for discharge to nursing home    DISCHARGE MEDICATIONS:   Current Discharge Medication List           Details   amLODIPine (NORVASC) 5 MG tablet Take 1 tablet by mouth daily  Qty: 30 tablet, Refills: 3      doxycycline hyclate (VIBRA-TABS) 100 MG tablet Take 1 tablet by mouth 2 times daily for 5 days  Qty: 10 tablet, Refills: 0              Details   ibuprofen (ADVIL;MOTRIN) 600 MG tablet Take 600 mg by mouth every 8 hours as needed for Pain      Cholecalciferol (VITAMIN D3) 125 MCG (5000 UT) TABS Take 5,000 Units by mouth daily      ammonium lactate (LAC-HYDRIN) 12 % lotion Apply topically daily. Qty: 222 mL, Refills: 5    Associated Diagnoses: PVD (peripheral vascular disease) ([facility] [address] Venous insufficiency (chronic) (peripheral)             FOLLOW UP/INSTRUCTIONS:  · This patient is instructed to follow-up with her primary care physician. · Patient is instructed to follow-up with the consults listed above as directed by them. · she is instructed to resume home medications and take new medications as indicated in the list above. · If the patient has a recurrence of symptoms, she is instructed to go to the ED. Preparing for this patient's discharge, including paperwork, orders, instructions, and meeting with patient did require > 40 minutes.     Annette Marquez DO     2/26/2022  8:59 AM

## 2022-02-26 NOTE — CARE COORDINATION
SS Note: [facility] is arranged to  pt 8PM to transport pt to [address] [place] [address] for a skilled level of care. SW updated pt's daughter Jeny Ortez in pt's room and she is aware [facility] is calling other ambulance companies to try to arranged earlier  time. [facility] Exemption form completed.   Electronically signed by BENNY Stark on 2/26/2022 at 2:33 PM

## 2022-02-26 NOTE — PLAN OF CARE
Problem: Falls - Risk of:  Goal: Will remain free from falls  Description: Will remain free from falls  2/26/2022 1251 by Ilene Martínez  Outcome: Met This Shift     Problem: Skin Integrity:  Goal: Will show no infection signs and symptoms  Description: Will show no infection signs and symptoms  Outcome: Met This Shift     Problem: Skin Integrity:  Goal: Absence of new skin breakdown  Description: Absence of new skin breakdown  2/26/2022 1251 by Ilene Martínez  Outcome: Met This Shift

## 2022-03-01 LAB
BLOOD CULTURE, ROUTINE: NORMAL
CULTURE, BLOOD 2: NORMAL

## 2022-08-01 ENCOUNTER — OFFICE VISIT (OUTPATIENT)
Dept: ORTHOPEDIC SURGERY | Age: 82
End: 2022-08-01
Payer: MEDICARE

## 2022-08-01 VITALS — BODY MASS INDEX: 23 KG/M2 | WEIGHT: 125 LBS | HEIGHT: 62 IN

## 2022-08-01 DIAGNOSIS — M97.8XXA PERI-PROSTHETIC FRACTURE AROUND PROSTHETIC HIP, INITIAL ENCOUNTER: Primary | ICD-10-CM

## 2022-08-01 DIAGNOSIS — Z96.649 PERI-PROSTHETIC FRACTURE AROUND PROSTHETIC HIP, INITIAL ENCOUNTER: Primary | ICD-10-CM

## 2022-08-01 PROCEDURE — 1090F PRES/ABSN URINE INCON ASSESS: CPT | Performed by: ORTHOPAEDIC SURGERY

## 2022-08-01 PROCEDURE — G8400 PT W/DXA NO RESULTS DOC: HCPCS | Performed by: ORTHOPAEDIC SURGERY

## 2022-08-01 PROCEDURE — 1036F TOBACCO NON-USER: CPT | Performed by: ORTHOPAEDIC SURGERY

## 2022-08-01 PROCEDURE — 1123F ACP DISCUSS/DSCN MKR DOCD: CPT | Performed by: ORTHOPAEDIC SURGERY

## 2022-08-01 PROCEDURE — G8427 DOCREV CUR MEDS BY ELIG CLIN: HCPCS | Performed by: ORTHOPAEDIC SURGERY

## 2022-08-01 PROCEDURE — 99213 OFFICE O/P EST LOW 20 MIN: CPT | Performed by: ORTHOPAEDIC SURGERY

## 2022-08-01 PROCEDURE — G8420 CALC BMI NORM PARAMETERS: HCPCS | Performed by: ORTHOPAEDIC SURGERY

## 2022-08-01 RX ORDER — ESCITALOPRAM OXALATE 10 MG/1
TABLET ORAL
COMMUNITY
Start: 2022-07-04

## 2022-08-01 RX ORDER — TRAMADOL HYDROCHLORIDE 50 MG/1
50 TABLET ORAL EVERY 6 HOURS PRN
COMMUNITY

## 2022-08-01 RX ORDER — MEMANTINE HYDROCHLORIDE 10 MG/1
TABLET ORAL
COMMUNITY
Start: 2022-07-30

## 2022-08-01 NOTE — PROGRESS NOTES
Rakel Yusuf is a 78 y.o. female, who presents   Chief Complaint   Patient presents with    Leg Pain     Patient here for injury to the left femur fx. Patient fell on July [facility]th at the SNF. Patient has had imaging done at outside facilities, but had a CT done on July 29th (family has disc). Patient's daughter is present for appointment. HPI[de-identified] Left hip. Pains been present for some time. Mrs. Hernández is a resident of skilled care facility. She was walking 7/5/2022 and fell apparently hit on her left hip. There is no serious injury recognized. She had x-ray 7/6/2022 which were unremarkable according to her attendant today. She is continue to have discomfort which resulted in CT examination of the left hip July 29, 2022. She had a total hip replacement done years ago by me and has had no problems with that to date. Allergies; medications; past medical, surgical, family, and social history; and problem list have been reviewed today and updated as indicated in this encounter - see below following Ortho specifics. Musculoskeletal: Skin condition gross neurovascular functions good in the left lower extremity. There is tenderness around the greater trochanter and the lateral thigh. This radiates slightly anterior. With gentle range of motion including internal and external rotation there is no pain. Flexion is good in the hip with no pain   Her knee range of motion is good with no pain. Radiologic Studies: CT scan done 729 2022 [address] [facility] [address] shows a nondisplaced fracture of the greater trochanter of the left proximal femur. This is adjacent to the hip prosthesis which appears to be stable distally over a majority of the length of the stem. There is no compromise of the acetabulum. ASSESSMENT:  Mc Ibrahim was seen today for leg pain.     Diagnoses and all orders for this visit:    Rachel-prosthetic fracture around prosthetic hip, initial encounter     Treatment alternatives were reviewed including medical and physical therapies, injections, and surgical options, expected risks benefits and likely outcome of each were discussed in detail, questions asked and answered and understood. We discussed the injury as well as physical findings and imaging results. This is a periprosthetic left femur fracture with the appearance of a stable femoral component to this total hip replacement. The acetabulum is stable as well. PLAN: Partial weightbearing left lower extremity with walker ambulation. To protect this and give it a chance to heal.  We will follow-up in 4 weeks and see how her symptoms are progressing.         Patient Active Problem List   Diagnosis    Degenerative arthritis of hip R    Menopausal sweats    S/P hip replacement    Degenerativ osteoarthritis    Cervical facet syndrome, right greater than left    Protruded cervical disc    DDD (degenerative disc disease), cervical    Neural foraminal stenosis of cervical spine    Greater Occipital neuralgia    Facet arthropathy, cervical    Onychomycosis    PVD (peripheral vascular disease) (HCC)    Venous insufficiency (chronic) (peripheral)    Difficulty walking    Confusion    Altered mental status    Acute metabolic encephalopathy       Past Medical History:   Diagnosis Date    Arthritis     Dementia ([facility] [address]     Hypertension     white coat syndrome    Menopausal sweats 11/8/2011    PONV (postoperative nausea and vomiting)     with anesthesia    S/P hip replacement 11/8/2011       Past Surgical History:   Procedure Laterality Date    HYSTERECTOMY (CERVIX STATUS UNKNOWN)      JOINT REPLACEMENT  nov 2011    TJA right hip with cell saver    NERVE BLOCK Right 4/2/2014    right cervical paravertebral facet  #1    NERVE BLOCK      paravertebral facet    NERVE BLOCK Right 04 16 2014    cervical paravertebral facet #3    NERVE BLOCK Right 5/18/16    cervical facet #1    NERVE BLOCK Right 6/15/16    cervical paravert facet block #2    NERVE BLOCK Bilateral 05/23/2018 greater occipital nerve block with anesthesia #1    NERVE BLOCK Bilateral 06/06/2018    greater occipital #2    NERVE BLOCK Left 07/11/2018    greater occipital radiofrequency    NERVE BLOCK Right 08/15/2018    right occipital nerve radiofrequency    OTHER SURGICAL HISTORY  05/29/14    Radiofrequency right cervical C2-C6    OTHER SURGICAL HISTORY Right 08/31/2016    radiofrequency cervical    OTHER SURGICAL HISTORY Right 11/01/2017    cervical radiofrequency    NV INJECT RX OTHER PERIPH NERVE Left 7/11/2018    LEFT  GREATER OCCIPITAL RADIOFREQUENCY performed by Saad Jacob DO at [address] Right 8/15/2018    RIGHT  GREATER OCCIPITAL RADIOFREQUENCY performed by Saad Jacob DO at [address] NOSE/CLEFT LIP/TIP Bilateral 5/23/2018    BILATERAL GREATER OCCIPITAL NERVE BLOCK #1 UNDER ANESTHESIA performed by Saad Jacob DO at [address] NOSE/CLEFT LIP/TIP Bilateral 6/6/2018    BILATERAL GREATER OCCIPITAL NERVE BLOCK #2 UNDER ANESTHESIA performed by Saad Jacob DO at [address] Left 10/18/2016    anterior       Current Outpatient Medications   Medication Sig Dispense Refill    escitalopram (LEXAPRO) 10 MG tablet       memantine (NAMENDA) 10 MG tablet       metFORMIN (GLUCOPHAGE) 500 MG tablet       traMADol (ULTRAM) 50 MG tablet Take 50 mg by mouth every 6 hours as needed for Pain. amLODIPine (NORVASC) 5 MG tablet Take 1 tablet by mouth daily 30 tablet 3    ibuprofen (ADVIL;MOTRIN) 600 MG tablet Take 600 mg by mouth every 8 hours as needed for Pain      Cholecalciferol (VITAMIN D3) 125 MCG (5000 UT) TABS Take 5,000 Units by mouth daily (Patient not taking: Reported on 8/1/2022)      ammonium lactate (LAC-HYDRIN) 12 % lotion Apply topically daily. (Patient not taking: Reported on 8/1/2022) 222 mL 5     No current facility-administered medications for this visit.        Allergies Allergen Reactions    Liver Nausea And Vomiting    Other      Cannot take anything   That is extra strenghth    sensative  To meds    Darvocet [Propoxyphene N-Acetaminophen] Nausea And Vomiting    Shellfish-Derived Products Nausea And Vomiting       Social History     Socioeconomic History    Marital status:      Spouse name: None    Number of children: None    Years of education: None    Highest education level: None   Tobacco Use    Smoking status: Never    Smokeless tobacco: Never   Vaping Use    Vaping Use: Never used   Substance and Sexual Activity    Alcohol use: No    Drug use: No       Family History   Problem Relation Age of Onset    Parkinsonism Brother     Other Sister 10         at the age of 10 d/t appendicitis. Heart Failure Sister     High Cholesterol Sister          Review of Systems:   As follows except as previously noted in HPI:  Constitutional: Negative for chills, diaphoresis,  fever   Respiratory: Negative for cough, shortness of breath and wheezing. Cardiovascular: Negative for chest pain and palpitations. Neurological: Negative for dizziness, syncope,   GI / : abdominal pain or cramping  Musculoskeletal: see HPI       Objective:   Physical Exam   Constitutional: Oriented to person, place, and time. and appears well-developed and well-nourished. :   Head: Normocephalic and atraumatic. Neck: Neck supple. Eyes: EOM are normal.   Pulmonary/Chest: Effort normal.  No respiratory distress, no wheezes. Neurological: Alert and oriented to person  Skin: Skin is warm and dry. Bam Parham DO    22  11:27 AM    All reasonable efforts have been made to minimize the risk of errors that may occur in the use of voice recognition and other electronic means of charting.

## 2022-09-16 ENCOUNTER — OFFICE VISIT (OUTPATIENT)
Dept: ORTHOPEDIC SURGERY | Age: 82
End: 2022-09-16
Payer: MEDICARE

## 2022-09-16 VITALS — HEIGHT: 62 IN | BODY MASS INDEX: 23 KG/M2 | WEIGHT: 125 LBS | TEMPERATURE: 98 F

## 2022-09-16 DIAGNOSIS — M97.8XXA PERI-PROSTHETIC FRACTURE AROUND PROSTHETIC HIP, INITIAL ENCOUNTER: Primary | ICD-10-CM

## 2022-09-16 DIAGNOSIS — Z96.649 PERI-PROSTHETIC FRACTURE AROUND PROSTHETIC HIP, INITIAL ENCOUNTER: Primary | ICD-10-CM

## 2022-09-16 PROCEDURE — 1123F ACP DISCUSS/DSCN MKR DOCD: CPT | Performed by: ORTHOPAEDIC SURGERY

## 2022-09-16 PROCEDURE — 99213 OFFICE O/P EST LOW 20 MIN: CPT | Performed by: ORTHOPAEDIC SURGERY

## 2022-09-16 NOTE — PROGRESS NOTES
Chief Complaint:   Chief Complaint   Patient presents with    Leg Pain     Left Femur FX F/U DOI 07/05/2022       Pepper Love follows about 11 weeks post injury involving a greater trochanteric fracture left hip which is periprosthetic. Got COVID infection since her last visit. She is testing negative now. She was restricted very much with activities at that time they stopped any therapy. She just looks like she is worn out right now      Allergies; medications; past medical, surgical, family, and social history; and problem list have been reviewed today and updated as indicated in this encounter seen below. Exam: There is minimal tenderness to palpation around the left hip. Range of motion passively is good. There is good stability. Radiographs: None    ASSESSMENT:    Nehal Navarrete was seen today for leg pain. Diagnoses and all orders for this visit:    Rachel-prosthetic fracture around prosthetic hip, initial encounter        PLAN: We will release her for full weightbearing left lower extremity. She should also be involved in active physical therapy to improve her strength stability and mobility. We will follow on an as-needed basis. Return if symptoms worsen or fail to improve. Current Outpatient Medications   Medication Sig Dispense Refill    escitalopram (LEXAPRO) 10 MG tablet       memantine (NAMENDA) 10 MG tablet       metFORMIN (GLUCOPHAGE) 500 MG tablet       traMADol (ULTRAM) 50 MG tablet Take 50 mg by mouth every 6 hours as needed for Pain. amLODIPine (NORVASC) 5 MG tablet Take 1 tablet by mouth daily 30 tablet 3    ibuprofen (ADVIL;MOTRIN) 600 MG tablet Take 600 mg by mouth every 8 hours as needed for Pain      Cholecalciferol (VITAMIN D3) 125 MCG (5000 UT) TABS Take 5,000 Units by mouth daily      ammonium lactate (LAC-HYDRIN) 12 % lotion Apply topically daily. 222 mL 5     No current facility-administered medications for this visit.        Patient Active Problem List   Diagnosis Degenerative arthritis of hip R    Menopausal sweats    S/P hip replacement    Degenerativ osteoarthritis    Cervical facet syndrome, right greater than left    Protruded cervical disc    DDD (degenerative disc disease), cervical    Neural foraminal stenosis of cervical spine    Greater Occipital neuralgia    Facet arthropathy, cervical    Onychomycosis    PVD (peripheral vascular disease) (HCC)    Venous insufficiency (chronic) (peripheral)    Difficulty walking    Confusion    Altered mental status    Acute metabolic encephalopathy       Past Medical History:   Diagnosis Date    Arthritis     Dementia ([facility] [address]     Hypertension     white coat syndrome    Menopausal sweats 11/8/2011    PONV (postoperative nausea and vomiting)     with anesthesia    S/P hip replacement 11/8/2011       Past Surgical History:   Procedure Laterality Date    HYSTERECTOMY (CERVIX STATUS UNKNOWN)      JOINT REPLACEMENT  nov 2011    TJA right hip with cell saver    NERVE BLOCK Right 4/2/2014    right cervical paravertebral facet  #1    NERVE BLOCK      paravertebral facet    NERVE BLOCK Right 04 16 2014    cervical paravertebral facet #3    NERVE BLOCK Right 5/18/16    cervical facet #1    NERVE BLOCK Right 6/15/16    cervical paravert facet block #2    NERVE BLOCK Bilateral 05/23/2018    greater occipital nerve block with anesthesia #1    NERVE BLOCK Bilateral 06/06/2018    greater occipital #2    NERVE BLOCK Left 07/11/2018    greater occipital radiofrequency    NERVE BLOCK Right 08/15/2018    right occipital nerve radiofrequency    OTHER SURGICAL HISTORY  05/29/14    Radiofrequency right cervical C2-C6    OTHER SURGICAL HISTORY Right 08/31/2016    radiofrequency cervical    OTHER SURGICAL HISTORY Right 11/01/2017    cervical radiofrequency    SD INJECT RX OTHER PERIPH NERVE Left 7/11/2018    LEFT  GREATER OCCIPITAL RADIOFREQUENCY performed by Megan Frias DO at [address] [facility] Right 8/15/2018 RIGHT  GREATER OCCIPITAL RADIOFREQUENCY performed by Dana Allen DO at [address] NOSE/CLEFT LIP/TIP Bilateral 5/23/2018    BILATERAL GREATER OCCIPITAL NERVE BLOCK #1 UNDER ANESTHESIA performed by Dana Allen DO at [address] NOSE/CLEFT LIP/TIP Bilateral 6/6/2018    BILATERAL GREATER OCCIPITAL NERVE BLOCK #2 UNDER ANESTHESIA performed by Dana lAlen DO at [address] Left 10/18/2016    anterior       Allergies   Allergen Reactions    Liver Nausea And Vomiting    Other      Cannot take anything   That is extra strenghth    sensative  To meds    Darvocet [Propoxyphene N-Acetaminophen] Nausea And Vomiting    Shellfish-Derived Products Nausea And Vomiting       Social History     Socioeconomic History    Marital status:      Spouse name: None    Number of children: None    Years of education: None    Highest education level: None   Tobacco Use    Smoking status: Never    Smokeless tobacco: Never   Vaping Use    Vaping Use: Never used   Substance and Sexual Activity    Alcohol use: No    Drug use: No       Review of Systems  As follows except as previously noted in HPI:  Constitutional: Negative for chills, diaphoresis, fatigue, fever and unexpected weight change. Respiratory: Negative for cough, shortness of breath and wheezing. Cardiovascular: Negative for chest pain and palpitations. Neurological: Negative for dizziness, syncope, cephalgia. GI / : negative  Musculoskeletal: see HPI       Objective:   Physical Exam   Constitutional: Oriented to person, place, and time. and appears well-developed and well-nourished. :   Head: Normocephalic and atraumatic. Eyes: EOM are normal.   Neck: Neck supple. Cardiovascular: Normal rate and regular rhythm. Pulmonary/Chest: Effort normal. No stridor. No respiratory distress, no wheezes. Abdominal:  No abnormal distension.     Neurological: Alert and oriented to person, place, and time. Skin: Skin is warm and dry. Psychiatric: Normal mood and affect.  Behavior is normal. Thought content normal.    FLORES Hatch DO    9/16/22  12:01 PM

## 2023-05-15 NOTE — ANESTHESIA PRE PROCEDURE
Department of Anesthesiology  Preprocedure Note       Name:  Alyssa Tucker   Age:  [de-identified] y.o.  :  1940                                          MRN:  [id number]         Date:  8/15/2018      Surgeon: Alonzo Elizabeth):  Chantelle Mendez DO    Procedure: Procedure(s):  RIGHT GREATER OCCIPITAL RFA    Medications prior to admission:   Prior to Admission medications    Medication Sig Start Date End Date Taking? Authorizing Provider   ibuprofen (ADVIL;MOTRIN) 200 MG tablet Take 200 mg by mouth every 6 hours as needed for Pain. Yes Historical Provider, MD   lisinopril (PRINIVIL;ZESTRIL) 10 MG tablet Take 10 mg by mouth daily    Historical Provider, MD   Biotin 1000 MCG TABS Take by mouth Daily    Historical Provider, MD   Calcium Carbonate-Vitamin D (CALTRATE 600+D PO) Take by mouth daily     Historical Provider, MD   Omega-3 Fatty Acids (FISH OIL) 1200 MG CAPS Take 1 capsule by mouth 2 times daily Stopped 5 days pre op    Historical Provider, MD   celecoxib (CELEBREX) 200 MG capsule Take 200 mg by mouth nightly     Historical Provider, MD   Multiple Vitamins-Minerals (THERAPEUTIC MULTIVITAMIN-MINERALS) tablet Take 1 tablet by mouth daily. Historical Provider, MD       Current medications:    Current Facility-Administered Medications   Medication Dose Route Frequency Provider Last Rate Last Dose    lactated ringers infusion   Intravenous Continuous Khris Patterson MD        ceFAZolin (ANCEF) 2 g in dextrose 3 % 50 mL IVPB (duplex)  2 g Intravenous Once Chantelle Mendez DO           Allergies:     Allergies   Allergen Reactions    Liver Nausea And Vomiting    Other      Cannot take anything   That is extra strenghth    sensative  To meds    Darvocet [Propoxyphene N-Acetaminophen] Nausea And Vomiting    Shellfish-Derived Products Nausea And Vomiting       Problem List:    Patient Active Problem List   Diagnosis Code    Degenerative arthritis of hip R M16.9    Menopausal sweats N95.1    S/P hip replacement M71.700    Degenerativ osteoarthritis M19.90    Cervical facet syndrome, right greater than left M46.92    Protruded cervical disc M50.20    DDD (degenerative disc disease), cervical M50.30    Neural foraminal stenosis of cervical spine M99.81    Greater Occipital neuralgia M54.81    Facet arthropathy, cervical (HCC) M46.92       Past Medical History:        Diagnosis Date    Arthritis     Dementia     Hypertension     white coat syndrome    Menopausal sweats 11/8/2011    PONV (postoperative nausea and vomiting)     with anesthesia    S/P hip replacement 11/8/2011       Past Surgical History:        Procedure Laterality Date    HYSTERECTOMY      JOINT REPLACEMENT  nov 2011    TJA right hip with cell saver    NERVE BLOCK Right 4/2/2014    right cervical paravertebral facet  #1    NERVE BLOCK      paravertebral facet    NERVE BLOCK Right 04 16 2014    cervical paravertebral facet #3    NERVE BLOCK Right 5/18/16    cervical facet #1    NERVE BLOCK Right 6/15/16    cervical paravert facet block #2    NERVE BLOCK Bilateral 05/23/2018    greater occipital nerve block with anesthesia #1    NERVE BLOCK Bilateral 06/06/2018    greater occipital #2    NERVE BLOCK Left 07/11/2018    greater occipital radiofrequency    OTHER SURGICAL HISTORY  05/29/14    Radiofrequency right cervical C2-C6    OTHER SURGICAL HISTORY Right 08/31/2016    radiofrequency cervical    OTHER SURGICAL HISTORY Right 11/01/2017    cervical radiofrequency    PA INJECT RX OTHER PERIPH NERVE Left 7/11/2018    LEFT  GREATER OCCIPITAL RADIOFREQUENCY performed by Leon Carrasco DO at [id number] [facility] NOSE/CLEFT LIP/TIP Bilateral 5/23/2018    BILATERAL GREATER OCCIPITAL NERVE BLOCK #1 UNDER ANESTHESIA performed by Leon Carrasco DO at [address] [facility] NOSE/CLEFT LIP/TIP Bilateral 6/6/2018    BILATERAL GREATER OCCIPITAL NERVE BLOCK #2 UNDER ANESTHESIA performed by Leon Carrasco DO at [address] [place] [address] [facility] HCG (If Applicable): No results found for: PREGTESTUR, PREGSERUM, HCG, HCGQUANT     ABGs: No results found for: PHART, PO2ART, TTW4NBJ, VNM2ULT, BEART, [id number]     Type & Screen (If Applicable):  No results found for: LABABO, [address]    Anesthesia Evaluation  Patient summary reviewed   history of anesthetic complications: PONV. Airway: Mallampati: IV  TM distance: >3 FB   Neck ROM: limited  Mouth opening: > = 3 FB Dental:          Pulmonary:Negative Pulmonary ROS and normal exam  breath sounds clear to auscultation                             Cardiovascular:    (+) hypertension:,       ECG reviewed  Rhythm: regular  Rate: normal           Beta Blocker:  Not on Beta Blocker      ROS comment: EKG: Normal Sinus Rhythm 75, poor R Progression. Neuro/Psych:   (+) psychiatric history:            GI/Hepatic/Renal: Neg GI/Hepatic/Renal ROS            Endo/Other:    (+) : arthritis:., .                  ROS comment: Menopausal sweats. H/O Hip Replacement. Abdominal:           Vascular: negative vascular ROS. Anesthesia Plan      MAC     ASA 3       Induction: intravenous. Anesthetic plan and risks discussed with patient. Plan discussed with CRNA.                 Delroy Salinas MD   8/15/2018 Sotyktu Pregnancy And Lactation Text: There is insufficient data to evaluate whether or not Sotyktu is safe to use during pregnancy.? ?It is not known if Sotyktu passes into breast milk and whether or not it is safe to use when breastfeeding.??

## (undated) DEVICE — NEEDLE HYPO 25GA L1.5IN BLU POLYPR HUB S STL REG BVL STR

## (undated) DEVICE — Z DISCONTINUED APPLICATOR SURG PREP 0.35OZ 2% CHG 70% ISO ALC W/ HI LT

## (undated) DEVICE — CVD CANNULA

## (undated) DEVICE — CONTROL SYRINGE LUER-LOCK TIP: Brand: MONOJECT

## (undated) DEVICE — 12 ML SYRINGE,LUER-LOCK TIP: Brand: MONOJECT

## (undated) DEVICE — NEEDLE HYPO 18GA L1.5IN PNK POLYPR HUB S STL THN WALL FILL

## (undated) DEVICE — BANDAGE ADH W1XL3IN NAT FAB WVN FLX DURABLE N ADH PD SEAL

## (undated) DEVICE — TOWEL OR BLUEE 16X26IN ST 8 PACK ORB08 16X26ORTWL

## (undated) DEVICE — GLOVE RAD REDUC 8.5 IN

## (undated) DEVICE — GAUZE,SPONGE,4"X4",12PLY,STERILE,LF,2'S: Brand: MEDLINE

## (undated) DEVICE — ELECTRODE SURG MPLR NEUT SELF ADH PT PLT MULTIGEN

## (undated) DEVICE — STERILE POLYISOPRENE POWDER-FREE SURGICAL GLOVES: Brand: PROTEXIS